# Patient Record
Sex: MALE | Race: WHITE | NOT HISPANIC OR LATINO | URBAN - METROPOLITAN AREA
[De-identification: names, ages, dates, MRNs, and addresses within clinical notes are randomized per-mention and may not be internally consistent; named-entity substitution may affect disease eponyms.]

---

## 2018-01-17 ENCOUNTER — OUTPATIENT (OUTPATIENT)
Dept: OUTPATIENT SERVICES | Facility: HOSPITAL | Age: 68
LOS: 1 days | Discharge: HOME | End: 2018-01-17

## 2018-01-17 DIAGNOSIS — R91.8 OTHER NONSPECIFIC ABNORMAL FINDING OF LUNG FIELD: ICD-10-CM

## 2022-10-28 ENCOUNTER — INPATIENT (INPATIENT)
Facility: HOSPITAL | Age: 72
LOS: 2 days | Discharge: HOME | End: 2022-10-31
Attending: INTERNAL MEDICINE | Admitting: INTERNAL MEDICINE

## 2022-10-28 VITALS
SYSTOLIC BLOOD PRESSURE: 182 MMHG | DIASTOLIC BLOOD PRESSURE: 100 MMHG | WEIGHT: 179.9 LBS | RESPIRATION RATE: 20 BRPM | OXYGEN SATURATION: 98 % | TEMPERATURE: 98 F | HEART RATE: 87 BPM

## 2022-10-28 LAB
ALBUMIN SERPL ELPH-MCNC: 4.4 G/DL — SIGNIFICANT CHANGE UP (ref 3.5–5.2)
ALP SERPL-CCNC: 95 U/L — SIGNIFICANT CHANGE UP (ref 30–115)
ALT FLD-CCNC: 16 U/L — SIGNIFICANT CHANGE UP (ref 0–41)
ANION GAP SERPL CALC-SCNC: 10 MMOL/L — SIGNIFICANT CHANGE UP (ref 7–14)
AST SERPL-CCNC: 19 U/L — SIGNIFICANT CHANGE UP (ref 0–41)
BASOPHILS # BLD AUTO: 0.03 K/UL — SIGNIFICANT CHANGE UP (ref 0–0.2)
BASOPHILS NFR BLD AUTO: 0.2 % — SIGNIFICANT CHANGE UP (ref 0–1)
BILIRUB SERPL-MCNC: 0.4 MG/DL — SIGNIFICANT CHANGE UP (ref 0.2–1.2)
BUN SERPL-MCNC: 17 MG/DL — SIGNIFICANT CHANGE UP (ref 10–20)
CALCIUM SERPL-MCNC: 9.2 MG/DL — SIGNIFICANT CHANGE UP (ref 8.4–10.4)
CHLORIDE SERPL-SCNC: 102 MMOL/L — SIGNIFICANT CHANGE UP (ref 98–110)
CO2 SERPL-SCNC: 25 MMOL/L — SIGNIFICANT CHANGE UP (ref 17–32)
CREAT SERPL-MCNC: 1.4 MG/DL — SIGNIFICANT CHANGE UP (ref 0.7–1.5)
EGFR: 54 ML/MIN/1.73M2 — LOW
EOSINOPHIL # BLD AUTO: 0.02 K/UL — SIGNIFICANT CHANGE UP (ref 0–0.7)
EOSINOPHIL NFR BLD AUTO: 0.1 % — SIGNIFICANT CHANGE UP (ref 0–8)
GLUCOSE SERPL-MCNC: 177 MG/DL — HIGH (ref 70–99)
HCT VFR BLD CALC: 39.5 % — LOW (ref 42–52)
HGB BLD-MCNC: 13.1 G/DL — LOW (ref 14–18)
IMM GRANULOCYTES NFR BLD AUTO: 0.3 % — SIGNIFICANT CHANGE UP (ref 0.1–0.3)
LACTATE SERPL-SCNC: 2.6 MMOL/L — HIGH (ref 0.7–2)
LIDOCAIN IGE QN: 29 U/L — SIGNIFICANT CHANGE UP (ref 7–60)
LYMPHOCYTES # BLD AUTO: 1.11 K/UL — LOW (ref 1.2–3.4)
LYMPHOCYTES # BLD AUTO: 8.1 % — LOW (ref 20.5–51.1)
MCHC RBC-ENTMCNC: 29.7 PG — SIGNIFICANT CHANGE UP (ref 27–31)
MCHC RBC-ENTMCNC: 33.2 G/DL — SIGNIFICANT CHANGE UP (ref 32–37)
MCV RBC AUTO: 89.6 FL — SIGNIFICANT CHANGE UP (ref 80–94)
MONOCYTES # BLD AUTO: 0.74 K/UL — HIGH (ref 0.1–0.6)
MONOCYTES NFR BLD AUTO: 5.4 % — SIGNIFICANT CHANGE UP (ref 1.7–9.3)
NEUTROPHILS # BLD AUTO: 11.72 K/UL — HIGH (ref 1.4–6.5)
NEUTROPHILS NFR BLD AUTO: 85.9 % — HIGH (ref 42.2–75.2)
NRBC # BLD: 0 /100 WBCS — SIGNIFICANT CHANGE UP (ref 0–0)
PLATELET # BLD AUTO: 165 K/UL — SIGNIFICANT CHANGE UP (ref 130–400)
POTASSIUM SERPL-MCNC: 4.5 MMOL/L — SIGNIFICANT CHANGE UP (ref 3.5–5)
POTASSIUM SERPL-SCNC: 4.5 MMOL/L — SIGNIFICANT CHANGE UP (ref 3.5–5)
PROT SERPL-MCNC: 7.5 G/DL — SIGNIFICANT CHANGE UP (ref 6–8)
RBC # BLD: 4.41 M/UL — LOW (ref 4.7–6.1)
RBC # FLD: 14.6 % — HIGH (ref 11.5–14.5)
SODIUM SERPL-SCNC: 137 MMOL/L — SIGNIFICANT CHANGE UP (ref 135–146)
WBC # BLD: 13.66 K/UL — HIGH (ref 4.8–10.8)
WBC # FLD AUTO: 13.66 K/UL — HIGH (ref 4.8–10.8)

## 2022-10-28 PROCEDURE — 71045 X-RAY EXAM CHEST 1 VIEW: CPT | Mod: 26

## 2022-10-28 PROCEDURE — 99285 EMERGENCY DEPT VISIT HI MDM: CPT

## 2022-10-28 PROCEDURE — 74177 CT ABD & PELVIS W/CONTRAST: CPT | Mod: 26,MA

## 2022-10-28 RX ORDER — SODIUM CHLORIDE 9 MG/ML
1000 INJECTION, SOLUTION INTRAVENOUS ONCE
Refills: 0 | Status: COMPLETED | OUTPATIENT
Start: 2022-10-28 | End: 2022-10-28

## 2022-10-28 RX ORDER — SODIUM CHLORIDE 9 MG/ML
1000 INJECTION INTRAMUSCULAR; INTRAVENOUS; SUBCUTANEOUS ONCE
Refills: 0 | Status: COMPLETED | OUTPATIENT
Start: 2022-10-28 | End: 2022-10-28

## 2022-10-28 RX ORDER — MORPHINE SULFATE 50 MG/1
4 CAPSULE, EXTENDED RELEASE ORAL ONCE
Refills: 0 | Status: DISCONTINUED | OUTPATIENT
Start: 2022-10-28 | End: 2022-10-28

## 2022-10-28 RX ADMIN — SODIUM CHLORIDE 1000 MILLILITER(S): 9 INJECTION INTRAMUSCULAR; INTRAVENOUS; SUBCUTANEOUS at 20:49

## 2022-10-28 RX ADMIN — MORPHINE SULFATE 4 MILLIGRAM(S): 50 CAPSULE, EXTENDED RELEASE ORAL at 20:49

## 2022-10-28 NOTE — ED PROVIDER NOTE - NS ED ATTENDING STATEMENT MOD
This was a shared visit with the GRZEGORZ. I reviewed and verified the documentation and independently performed the documented:

## 2022-10-28 NOTE — ED PROVIDER NOTE - CLINICAL SUMMARY MEDICAL DECISION MAKING FREE TEXT BOX
Patient evaluated for left flank pain, found to have obstructing stone, UA without bacteria noted.  Lactate elevated to 2.7 and patient treated with IV fluids and pain control.  Lactate repeated with slight elevation to 2.7. Patient denied any chronic alcohol use, no medications or additional history that could contribute to persistent lactate. Given concern for potential for septic stone and persistent lactate, patient admitted for further work-up, monitoring and treatment. Discussed extensively with patient and wife and all results discussed, questions answered.  Patient agreed to admission.

## 2022-10-28 NOTE — ED PROVIDER NOTE - OBJECTIVE STATEMENT
71 yold male to ED Pmhx Htn, Dm, Multiple myeloma c/o left side flank pain radiating to LLQ started earlier today with nausea no vomiting; no urinary sx - burning, urgency, frequency or hematuria; no fever, chills; pt denies prior hx of kidney stones or diverticulitis; pt attributes sx to possibly chinese food left overs; deneis diarrhea or brbpr;

## 2022-10-28 NOTE — ED PROVIDER NOTE - PHYSICAL EXAMINATION
Constitutional: Well developed, well nourished. NAD  Head: Normocephalic, atraumatic.  Eyes: PERRL, EOMI.  ENT: No nasal discharge. Mucous membranes dry.  Neck: Supple. Painless ROM.  Cardiovascular:  Regular rate and rhythm.  Pulmonary:  Lungs clear to auscultation bilaterally.   Abdominal: Soft. +mild left flank tenderness and LLQ tenderness; no rebound, guarding; no rash;   Extremities. Pelvis stable. No lower extremity edema, symmetric calves.  Skin: No rashes, cyanosis.  Neuro: AAOx3. No focal neurological deficits.  Psych: Normal mood. Normal affect.

## 2022-10-28 NOTE — ED PROVIDER NOTE - ATTENDING APP SHARED VISIT CONTRIBUTION OF CARE
71-year-old male past medical history hypertension multiple myeloma presents with left-sided flank pain rating to the left lower quadrant nausea no vomiting no urinary symptoms no fever chills no history of kidney stones.  Pain worse after eating Chinese food.  Well appearing, NAD, non toxic. NCAT PERRLA EOMI neck supple non tender normal wob cta bl rrr abdomen s left-sided flank pain, left lower quadrant pain nd no rebound no guarding WWPx4 neuro non focal.  Rule out kidney stone versus UTI versus diverticulitis labs CT reassess

## 2022-10-28 NOTE — ED PROVIDER NOTE - CARE PLAN
Principal Discharge DX:	Nephrolithiasis  Secondary Diagnosis:	High serum lactate  Secondary Diagnosis:	Multiple myeloma   1

## 2022-10-29 LAB
APPEARANCE UR: CLEAR — SIGNIFICANT CHANGE UP
BACTERIA # UR AUTO: ABNORMAL
BASE EXCESS BLDV CALC-SCNC: -0.1 MMOL/L — SIGNIFICANT CHANGE UP (ref -2–3)
BILIRUB UR-MCNC: NEGATIVE — SIGNIFICANT CHANGE UP
CA-I SERPL-SCNC: 1.2 MMOL/L — SIGNIFICANT CHANGE UP (ref 1.15–1.33)
COLOR SPEC: SIGNIFICANT CHANGE UP
DIFF PNL FLD: ABNORMAL
EPI CELLS # UR: 3 /HPF — SIGNIFICANT CHANGE UP (ref 0–5)
GAS PNL BLDV: 134 MMOL/L — LOW (ref 136–145)
GAS PNL BLDV: SIGNIFICANT CHANGE UP
GLUCOSE BLDC GLUCOMTR-MCNC: 124 MG/DL — HIGH (ref 70–99)
GLUCOSE BLDC GLUCOMTR-MCNC: 137 MG/DL — HIGH (ref 70–99)
GLUCOSE BLDC GLUCOMTR-MCNC: 144 MG/DL — HIGH (ref 70–99)
GLUCOSE UR QL: ABNORMAL
GRAN CASTS # UR COMP ASSIST: 2 /LPF — HIGH
HCO3 BLDV-SCNC: 25 MMOL/L — SIGNIFICANT CHANGE UP (ref 22–29)
HCT VFR BLDA CALC: 37 % — LOW (ref 39–51)
HGB BLD CALC-MCNC: 12.2 G/DL — LOW (ref 12.6–17.4)
HYALINE CASTS # UR AUTO: 6 /LPF — SIGNIFICANT CHANGE UP (ref 0–7)
KETONES UR-MCNC: SIGNIFICANT CHANGE UP
LACTATE BLDV-MCNC: 2.7 MMOL/L — HIGH (ref 0.5–2)
LACTATE SERPL-SCNC: 2.8 MMOL/L — HIGH (ref 0.7–2)
LEUKOCYTE ESTERASE UR-ACNC: NEGATIVE — SIGNIFICANT CHANGE UP
NITRITE UR-MCNC: NEGATIVE — SIGNIFICANT CHANGE UP
PCO2 BLDV: 44 MMHG — SIGNIFICANT CHANGE UP (ref 42–55)
PH BLDV: 7.37 — SIGNIFICANT CHANGE UP (ref 7.32–7.43)
PH UR: 6.5 — SIGNIFICANT CHANGE UP (ref 5–8)
PO2 BLDV: 39 MMHG — SIGNIFICANT CHANGE UP
POTASSIUM BLDV-SCNC: 4.6 MMOL/L — SIGNIFICANT CHANGE UP (ref 3.5–5.1)
PROT UR-MCNC: SIGNIFICANT CHANGE UP
RBC CASTS # UR COMP ASSIST: 14 /HPF — HIGH (ref 0–4)
SAO2 % BLDV: 68.1 % — SIGNIFICANT CHANGE UP
SARS-COV-2 RNA SPEC QL NAA+PROBE: SIGNIFICANT CHANGE UP
SP GR SPEC: 1.02 — SIGNIFICANT CHANGE UP (ref 1.01–1.03)
UROBILINOGEN FLD QL: SIGNIFICANT CHANGE UP
WBC UR QL: 3 /HPF — SIGNIFICANT CHANGE UP (ref 0–5)

## 2022-10-29 PROCEDURE — 99238 HOSP IP/OBS DSCHRG MGMT 30/<: CPT | Mod: 25

## 2022-10-29 PROCEDURE — 99222 1ST HOSP IP/OBS MODERATE 55: CPT

## 2022-10-29 RX ORDER — LOSARTAN POTASSIUM 100 MG/1
100 TABLET, FILM COATED ORAL DAILY
Refills: 0 | Status: DISCONTINUED | OUTPATIENT
Start: 2022-10-29 | End: 2022-10-31

## 2022-10-29 RX ORDER — TAMSULOSIN HYDROCHLORIDE 0.4 MG/1
2 CAPSULE ORAL
Qty: 0 | Refills: 0 | DISCHARGE
Start: 2022-10-29

## 2022-10-29 RX ORDER — ONDANSETRON 8 MG/1
4 TABLET, FILM COATED ORAL THREE TIMES A DAY
Refills: 0 | Status: DISCONTINUED | OUTPATIENT
Start: 2022-10-29 | End: 2022-10-31

## 2022-10-29 RX ORDER — ALLOPURINOL 300 MG
1 TABLET ORAL
Qty: 0 | Refills: 0 | DISCHARGE

## 2022-10-29 RX ORDER — TAMSULOSIN HYDROCHLORIDE 0.4 MG/1
0.8 CAPSULE ORAL AT BEDTIME
Refills: 0 | Status: DISCONTINUED | OUTPATIENT
Start: 2022-10-29 | End: 2022-10-31

## 2022-10-29 RX ORDER — METFORMIN HYDROCHLORIDE 850 MG/1
1 TABLET ORAL
Qty: 0 | Refills: 0 | DISCHARGE

## 2022-10-29 RX ORDER — SODIUM CHLORIDE 9 MG/ML
1000 INJECTION INTRAMUSCULAR; INTRAVENOUS; SUBCUTANEOUS
Refills: 0 | Status: DISCONTINUED | OUTPATIENT
Start: 2022-10-29 | End: 2022-10-31

## 2022-10-29 RX ORDER — OLMESARTAN MEDOXOMIL 5 MG/1
1 TABLET, FILM COATED ORAL
Qty: 0 | Refills: 0 | DISCHARGE

## 2022-10-29 RX ORDER — FINASTERIDE 5 MG/1
5 TABLET, FILM COATED ORAL DAILY
Refills: 0 | Status: DISCONTINUED | OUTPATIENT
Start: 2022-10-29 | End: 2022-10-31

## 2022-10-29 RX ORDER — CEFTRIAXONE 500 MG/1
1000 INJECTION, POWDER, FOR SOLUTION INTRAMUSCULAR; INTRAVENOUS EVERY 24 HOURS
Refills: 0 | Status: DISCONTINUED | OUTPATIENT
Start: 2022-10-29 | End: 2022-10-31

## 2022-10-29 RX ORDER — HEPARIN SODIUM 5000 [USP'U]/ML
5000 INJECTION INTRAVENOUS; SUBCUTANEOUS EVERY 12 HOURS
Refills: 0 | Status: DISCONTINUED | OUTPATIENT
Start: 2022-10-29 | End: 2022-10-31

## 2022-10-29 RX ORDER — CEFTRIAXONE 500 MG/1
1000 INJECTION, POWDER, FOR SOLUTION INTRAMUSCULAR; INTRAVENOUS ONCE
Refills: 0 | Status: COMPLETED | OUTPATIENT
Start: 2022-10-29 | End: 2022-10-29

## 2022-10-29 RX ORDER — ACETAMINOPHEN 500 MG
650 TABLET ORAL EVERY 6 HOURS
Refills: 0 | Status: DISCONTINUED | OUTPATIENT
Start: 2022-10-29 | End: 2022-10-31

## 2022-10-29 RX ORDER — DUTASTERIDE 0.5 MG/1
1 CAPSULE, LIQUID FILLED ORAL
Qty: 0 | Refills: 0 | DISCHARGE

## 2022-10-29 RX ORDER — ALLOPURINOL 300 MG
300 TABLET ORAL DAILY
Refills: 0 | Status: DISCONTINUED | OUTPATIENT
Start: 2022-10-29 | End: 2022-10-31

## 2022-10-29 RX ORDER — METOPROLOL TARTRATE 50 MG
1 TABLET ORAL
Qty: 0 | Refills: 0 | DISCHARGE

## 2022-10-29 RX ORDER — METOPROLOL TARTRATE 50 MG
100 TABLET ORAL DAILY
Refills: 0 | Status: DISCONTINUED | OUTPATIENT
Start: 2022-10-29 | End: 2022-10-31

## 2022-10-29 RX ADMIN — TAMSULOSIN HYDROCHLORIDE 0.8 MILLIGRAM(S): 0.4 CAPSULE ORAL at 21:34

## 2022-10-29 RX ADMIN — CEFTRIAXONE 100 MILLIGRAM(S): 500 INJECTION, POWDER, FOR SOLUTION INTRAMUSCULAR; INTRAVENOUS at 13:45

## 2022-10-29 RX ADMIN — SODIUM CHLORIDE 100 MILLILITER(S): 9 INJECTION INTRAMUSCULAR; INTRAVENOUS; SUBCUTANEOUS at 13:47

## 2022-10-29 RX ADMIN — MORPHINE SULFATE 4 MILLIGRAM(S): 50 CAPSULE, EXTENDED RELEASE ORAL at 04:02

## 2022-10-29 RX ADMIN — SODIUM CHLORIDE 1000 MILLILITER(S): 9 INJECTION, SOLUTION INTRAVENOUS at 00:14

## 2022-10-29 RX ADMIN — CEFTRIAXONE 100 MILLIGRAM(S): 500 INJECTION, POWDER, FOR SOLUTION INTRAMUSCULAR; INTRAVENOUS at 03:33

## 2022-10-29 RX ADMIN — HEPARIN SODIUM 5000 UNIT(S): 5000 INJECTION INTRAVENOUS; SUBCUTANEOUS at 21:34

## 2022-10-29 NOTE — DISCHARGE NOTE NURSING/CASE MANAGEMENT/SOCIAL WORK - NSDCPEFALRISK_GEN_ALL_CORE
For information on Fall & Injury Prevention, visit: [email or web address] OR  [email or web address] OR  https://www.cdc.gov/steadi/patient.html

## 2022-10-29 NOTE — DISCHARGE NOTE PROVIDER - NSDCMRMEDTOKEN_GEN_ALL_CORE_FT
allopurinol 300 mg oral tablet: 1 tab(s) orally once a day  amoxicillin-clavulanate 875 mg-125 mg oral tablet: 1 tab(s) orally 2 times a day ..  dutasteride 0.5 mg oral capsule: 1 cap(s) orally once a day  metFORMIN 500 mg oral tablet: 1 tab(s) orally 2 times a day  metoprolol succinate 100 mg oral tablet, extended release: 1 tab(s) orally once a day  olmesartan 40 mg oral tablet: 1 tab(s) orally once a day  tamsulosin 0.4 mg oral capsule: 2 cap(s) orally once a day (at bedtime)   allopurinol 300 mg oral tablet: 1 tab(s) orally once a day  amoxicillin-clavulanate 875 mg-125 mg oral tablet: 1 tab(s) orally 2 times a day ..  dutasteride 0.5 mg oral capsule: 1 cap(s) orally once a day  metFORMIN 500 mg oral tablet: 1 tab(s) orally 2 times a day  metoprolol succinate 100 mg oral tablet, extended release: 1 tab(s) orally once a day  olmesartan 40 mg oral tablet: 1 tab(s) orally once a day  tamsulosin 0.4 mg oral capsule: 2 cap(s) orally once a day (at bedtime)   traMADol 50 mg oral tablet: 1 tab(s) orally every 6 hours, As Needed -for severe pain MDD:4 tabs

## 2022-10-29 NOTE — DISCHARGE NOTE NURSING/CASE MANAGEMENT/SOCIAL WORK - PATIENT PORTAL LINK FT
You can access the FollowMyHealth Patient Portal offered by [facility] by registering at the following website: [email or web address]. By joining [employer]’s FollowMyHealth portal, you will also be able to view your health information using other applications (apps) compatible with our system.

## 2022-10-29 NOTE — CONSULT NOTE ADULT - NS ATTEND AMEND GEN_ALL_CORE FT
pt seen and examined 10/29/22 ct ap images visualized showing mild hydro level of small ureteral stone. pt is asymptomatic. non toxic. very much so wishes to leave hospital for trial of passage.  rec toradol flomax. and he understands I f f/c. he must return

## 2022-10-29 NOTE — H&P ADULT - ASSESSMENT
Pt is a 72 yo Male with a PMH including HTN, DM, multiple myeloma (follows Dr Campbell at [facility]), gout and BPH (follows w/ outside urologist in [place]) who presents to the hospital with left flank pain x 1 day. pt was found to have 4 mm obstructing calculus in the proximal left ureter with associated mild left hydroureter and hydronephrosis.    # 4 mm obstructing calculus in the proximal left ureter with associated mild left hydroureter and hydronephrosis  # SIRS present on admission: , wbc 13.6  # hx of BPH   - CT Abdomen and Pelvis w/ IV Cont (10.28.22 @ 22:53): 4 mm obstructing calculus in the proximal left ureter with associated mild left hydroureter and hydronephrosis. Markedly enlarged heterogeneous prostate. Partially distended urinary bladder with questionable circumferential wall thickening which may reflect sequela of underdistention as well as muscular hypertrophy. Cystitis is not excluded. Bilateral small fat-containing inguinal hernias  - WBC 13.6,  lactate 2.6. Cr 1.4,  - UA positive for blood, few bacteria and glucose.  - Pt is hemodynamically stable.   - s/p 2L IV fluid bolus and ceftriaxone IV In ED  - Urology recommendations appreciated> patient prefers trial of passage  versus cystoscopy and stent placement   - c/w ceftriaxone 1gr IV Q24  - NPO  - c/w IVF hydration NS at 100cc/hr  - F/u Ucx, Bcx  - start Flomax  - c/w finasteride  - Pain control with Tylenol; nausea control with zofran PRN  - Monitor urine output- repeat lactate at 20:00    # HTN  - BP: 177/84 (29 Oct 2022 07:55) (175/92 - 200/98)  - restart home medications: metoprolol succinate 100mg QD, on olmesartan 40 mg QD at home( losartan while hospitalised)    # MM  - patient reports he follows Dr. Campbell in [facility]> planned for chemotherapy in May 2023  - OP follow up    # DM  - on metformin at home  - hold oral meds;    - check fs;  start insulin basal, bolus, s/s prn if finger stick glucose >180 mg persistently    # gout  - c/w allopurinol    Diet: NPO  Activity: as tolerated  DVT Prophylaxis: heparin subQ  GI Prophylaxis: not indicated  CHG Order  Code Status: full code  Disposition: admit to medicine       Pt is a 70 yo Male with a PMH including HTN, DM, multiple myeloma (follows Dr Campbell at [facility]), gout and BPH (follows w/ outside urologist in [place]) who presents to the hospital with left flank pain x 1 day. pt was found to have 4 mm obstructing calculus in the proximal left ureter with associated mild left hydroureter and hydronephrosis.    # 4 mm obstructing calculus in the proximal left ureter with associated mild left hydroureter and hydronephrosis  # SIRS present on admission: , wbc 13.6  # hx of BPH   - CT Abdomen and Pelvis w/ IV Cont (10.28.22 @ 22:53): 4 mm obstructing calculus in the proximal left ureter with associated mild left hydroureter and hydronephrosis. Markedly enlarged heterogeneous prostate. Partially distended urinary bladder with questionable circumferential wall thickening which may reflect sequela of underdistention as well as muscular hypertrophy. Cystitis is not excluded. Bilateral small fat-containing inguinal hernias  - WBC 13.6,  lactate 2.6. Cr 1.4,  - UA positive for blood, few bacteria and glucose.  - Pt is hemodynamically stable.   - s/p 2L IV fluid bolus and ceftriaxone IV In ED  - Urology recommendations appreciated> patient prefers trial of passage  versus cystoscopy and stent placement   - c/w ceftriaxone 1gr IV Q24  - NPO  - c/w IVF hydration NS at 100cc/hr  - F/u Ucx, Bcx  - start Flomax  - c/w finasteride  - Pain control with Tylenol; nausea control with zofran PRN  - Monitor urine output- repeat lactate at 20:00    # HTN  - BP: 177/84 (29 Oct 2022 07:55) (175/92 - 200/98)  - restart home medications: metoprolol succinate 100mg QD, on olmesartan 40 mg QD at home( losartan while hospitalised)    # MM  - patient reports he follows Dr. Campbell in [facility]> planned for chemotherapy in May 2023  - OP follow up    # DM  - on metformin at home  - hold oral meds;    - check fs;  start insulin basal, bolus, s/s prn if finger stick glucose >180 mg persistently    # gout  - c/w allopurinol    Diet: NPO  Activity: as tolerated  DVT Prophylaxis: heparin subQ  GI Prophylaxis: not indicated  CHG Order  Code Status: full code  Disposition: admit to medicine    _______________________________________________________________________________________________________________________________________    70 yo Male with a PMH including HTN, DM, multiple myeloma (follows Dr Campbell at [facility]), gout and BPH (follows w/ outside urologist in [place]) who presents to the hospital with left flank pain x 1 day. Pt was found to have 4 mm obstructing calculus in the proximal left ureter with associated mild left hydroureter and hydronephrosis.    # Left flank pain secondary to left 4 mm obstructing calculus with mild left hydroureter and hydronephrosis  - SIRS present on admission / CT showing circumferential wall thickening and having leukocytosis, also having markedly enlarged heterogeneous prostate  - UA negative, urology recommending admission for observation and abx  - WBC 13.6,  lactate 2.6. Cr 1.4  - UA positive for blood, few bacteria and glucose.  - c/w IVF hydration NS at 100cc/hr  - F/u Ucx, Bcx  - c/w flomax and finasteride  - Pain control with Tylenol; nausea control with zofran PRN  - Monitor urine output- repeat lactate at 20:00  - outpatient nephrology for possible causes, likely component of MM however serum calcium is normal, however patient also has gout    # HTN  - BP: 177/84 (29 Oct 2022 07:55) (175/92 - 200/98)  - restart home medications: metoprolol succinate 100mg QD, on olmesartan 40 mg QD at home( losartan while hospitalised)    # MM  - patient reports he follows Dr. Campbell in [facility]> planned for chemotherapy in May 2023  - OP follow up    # DM  - on metformin at home  - hold oral meds;    - check fs;  start insulin basal, bolus, s/s prn if finger stick glucose >180 mg persistently    # gout  - c/w allopurinol    Diet: NPO as per urology  Activity: as tolerated  DVT Prophylaxis: heparin subQ  GI Prophylaxis: not indicated  Code Status: full code  Disposition: admit to medicine as per urology admitted for observation and abx    General: WN/WD NAD  Neurology: A&Ox3, nonfocal, ALLEN x 4  Head:  Normocephalic, atraumatic  ENT:  Mucosa moist, no ulcerations  Neck:  Supple, no sinuses or palpable masses  Lymphatic:  No palpable cervical, supraclavicular, axillary or inguinal adenopathy  Respiratory: CTA B/L  CV: RRR, S1S2, no murmur  Abdominal: Soft, NT, ND no palpable mass  MSK: +1 bl le edema  Incisions: intact, no erythema or drainage    Patient seen and examined independently. Agree with resident note with exceptions. Case discussed with housestaff, nursing and patient

## 2022-10-29 NOTE — H&P ADULT - NSHPPHYSICALEXAM_GEN_ALL_CORE
PHYSICAL EXAM:  GENERAL: NAD, AAO x 4, 71y M  HEAD:  Atraumatic, Normocephalic  EYES: EOMI, conjunctiva and sclera white  NECK: Supple, No JVD  CHEST/LUNG: Clear to auscultation bilaterally; No wheeze; No crackles; No accessory muscles used  HEART: Regular rate and rhythm; No murmurs;   ABDOMEN: Soft, Nontender, Nondistended; Bowel sounds present; No guarding, No organomegaly  EXTREMITIES:  2+ Peripheral Pulses, No cyanosis or edema  NEUROLOGY: non-focal PHYSICAL EXAM:  GENERAL: NAD, AAO x 4, 71y M  HEAD:  Atraumatic, Normocephalic  EYES: EOMI, conjunctiva and sclera white  NECK: Supple, No JVD  CHEST/LUNG: Clear to auscultation bilaterally; No wheeze; No crackles; No accessory muscles used  HEART: tachycardic; No murmurs; +s1 s2  ABDOMEN: Soft, Nondistended; mild tenderness to palpation on the left. Bowel sounds present; No guarding, No organomegaly  EXTREMITIES:  2+ Peripheral Pulses, No cyanosis or edema  NEUROLOGY: non-focal

## 2022-10-29 NOTE — DISCHARGE NOTE PROVIDER - CARE PROVIDER_API CALL
[address] [address]  Phone: [phone]  Fax: [phone]  Follow Up Time: 2 weeks    Jerry Henao)  Urology  [address] [place] [address], [address]  [place] [address]  Phone: [phone]  Fax: [phone]  Follow Up Time: 2 weeks   [address] [address]  Phone: [phone]  Fax: [phone]  Follow Up Time: 2 weeks    Jerry Henao)  Urology  [address] [place] [address], [address]  [place] [address]  Phone: [phone]  Fax: [phone]  Follow Up Time: 1 week

## 2022-10-29 NOTE — DISCHARGE NOTE PROVIDER - PROVIDER TOKENS
PROVIDER:[TOKEN:[[id number]],FOLLOWUP:[2 weeks]],PROVIDER:[TOKEN:[[id number]],FOLLOWUP:[2 weeks]] PROVIDER:[TOKEN:[[id number]],FOLLOWUP:[2 weeks]],PROVIDER:[TOKEN:[[id number]],FOLLOWUP:[1 week]]

## 2022-10-29 NOTE — H&P ADULT - NSICDXPASTMEDICALHX_GEN_ALL_CORE_FT
PAST MEDICAL HISTORY:  BPH (benign prostatic hyperplasia)     Diabetes     Gout     HTN (hypertension)     Multiple myeloma

## 2022-10-29 NOTE — ED ADULT NURSE REASSESSMENT NOTE - NS ED NURSE REASSESS COMMENT FT1
received at 4am - a&o x3, ambulated to bed w/o assist, VS as noted, lsc, abd soft, nt/nd, # 18 r ac h/l site wnl, flushes w/ ease, no blood return, awaits bed assignment - will continue to monitor

## 2022-10-29 NOTE — CONSULT NOTE ADULT - ASSESSMENT
Pt is a 70yo Male with a PMH including HTN, DM, multiple myeloma, and BPH (follows w/ outside urologist in [place]) who presents to the hospital with left flank pain x 1 day, found to have mild left hydronephrosis secondary to a 4x3mm proximal ureteral stone.    PLAN:  -Admission for observation and antibiotics  -Discussed options with patient including trial of passage versus cystoscopy and stent placement -- patient states that he would like to see if he can pass the stone at this time  -Patient will need stent placement should they become febrile -- will monitor for fevers  -Strain all urine  -Broad spectrum abx  -NPO for now, IVF hydration  -Trend Cr -- 1.4  -Trend WBC -- 13.66  -Flomax if not contraindicated  -Monitor urine output  -Will discuss with attending  -Will follow Pt is a 72yo Male with a PMH including HTN, DM, multiple myeloma, and BPH (follows w/ outside urologist in [place]) who presents to the hospital with left flank pain x 1 day, found to have mild left hydronephrosis secondary to a 4x3mm proximal ureteral stone.    PLAN:  -Admission for observation and antibiotics  -Discussed options with patient including trial of passage versus cystoscopy and stent placement -- patient states that he would like to see if he can pass the stone at this time  -Patient will need stent placement should they become febrile -- will monitor for fevers  -Strain all urine  -Broad spectrum abx  -NPO for now, IVF hydration  -Trend Cr -- 1.4  -Trend WBC -- 13.66  -F/u Ucx, Bcx  -Flomax if not contraindicated  -Pain and nausea control prn  -Monitor urine output  -Will discuss with attending  -Will follow

## 2022-10-29 NOTE — CONSULT NOTE ADULT - SUBJECTIVE AND OBJECTIVE BOX
Pt is a 72yo Male with a PMH including HTN, DM, multiple myeloma, and BPH (follows w/ outside urologist in [place]) who presents to the hospital with left flank pain x 1 day. Patient states that pain began suddenly and radiated to the groin, though has since resolved. Reports that at time of onset, he had mild nausea without vomiting, which has also since resolved. Denies history of stones or similar previous episodes. Denies fevers, chills, chest pain, SOB, n/v, dysuria, urgency, frequency, hematuria, or other urinary symptoms.     PAST MEDICAL & SURGICAL HISTORY:  HTN (hypertension)  DM  Multiple myeloma  BPH    Allergies  No Known Allergies    SOCIAL HISTORY: No illicit drug use    REVIEW OF SYSTEMS   [x] A ten-point review of systems was otherwise negative except as noted.    Vital Signs Last 24 Hrs  T(C): 36.9 (29 Oct 2022 03:14), Max: 36.9 (29 Oct 2022 03:14)  T(F): 98.4 (29 Oct 2022 03:14), Max: 98.4 (29 Oct 2022 03:14)  HR: 112 (29 Oct 2022 03:14) (87 - 112)  BP: 175/92 (29 Oct 2022 03:14) (175/92 - 182/100)  RR: 20 (29 Oct 2022 03:14) (20 - 20)  SpO2: 97% (29 Oct 2022 03:14) (97% - 98%)    Parameters below as of 28 Oct 2022 19:33  Patient On (Oxygen Delivery Method): room air    PHYSICAL EXAM:  GEN: NAD, awake and alert. Nontoxic appearing.  SKIN: Good color, non-diaphoretic.  HEENT: NC/AT.  RESP: No dyspnea, non-labored breathing. No use of accessory muscles.  CARDIO: +S1/S2  ABDO: Soft, nondistended, nontender to palpation. No palpable bladder, no suprapubic tenderness to palpation.   BACK: No CVAT bilaterally.   : Voiding freely.     LABS:             13.1   13.66 )-----------( 165      ( 28 Oct 2022 21:22 )             39.5     10-28  137  |  102  |  17  ----------------------------<  177<H>  4.5   |  25  |  1.4    Ca    9.2      28 Oct 2022 21:22    TPro  7.5  /  Alb  4.4  /  TBili  0.4  /  DBili  x   /  AST  19  /  ALT  16  /  AlkPhos  95  10-28    Urinalysis Basic - ( 28 Oct 2022 23:54 )  Color: Light Yellow / Appearance: Clear / S.017 / pH: x  Gluc: x / Ketone: Trace  / Bili: Negative / Urobili: <2 mg/dL   Blood: x / Protein: Trace / Nitrite: Negative   Leuk Esterase: Negative / RBC: 14 /HPF / WBC 3 /HPF   Sq Epi: x / Non Sq Epi: 3 /HPF / Bacteria: Few    RADIOLOGY & ADDITIONAL STUDIES:  < from: CT Abdomen and Pelvis w/ IV Cont (10.28.22 @ 22:53) >  FINDINGS:    LOWER CHEST: There is mild bibasilar subsegmental atelectasis.    HEPATOBILIARY: The liver is diminished in attenuation compatible with   fatty infiltration..  No evidence of intra or extrahepatic biliary   dilatation. The gallbladder is suboptimally imaged.    SPLEEN: Unremarkable.    PANCREAS: Unremarkable.    ADRENAL GLANDS: Unremarkable.    KIDNEYS: Delayed left nephrogram and left perinephric stranding extending   inferiorly along the left ureter with mild left hydroureter and   hydronephrosis secondary to a obstructing 4 x 3 mm calculus in the   proximal left ureter. Its density is approximately 700 HU. Bilateral   renal cortical irregularity compatible with sequela of prior insults.   Several hypodensities in the left kidney are too small to further   characterize. Unremarkable right ureter.    ABDOMINOPELVIC NODES: No enlarged abdominal or pelvic lymph nodes.    PELVIC ORGANS: Enlarged heterogeneous prostate measuring up to 6.7 cm   transverse. The urinary bladder is partially distended with questionable   circumferential wall thickening.    PERITONEUM/MESENTERY/BOWEL: No bowel obstruction, ascites or free   intraperitoneal air. Normal caliber appendix. Several focal densities in   the small bowel loops possibly reflecting pills.    BONES/SOFT TISSUES: There are degenerative changes of the spine.   Bilateral fat-containing inguinal hernias, small.    1.1 cm in cutaneous nodule is noted in the left anterior abdomen at the   level of the xiphoid (). This may reflect a sebaceous cyst. A similar   smaller noduleis noted in the right flank ()    VASCULAR:  The aorta is normal in caliber. Minimal atherosclerotic   calcifications.      IMPRESSION:    4 mm obstructing calculus in the proximal left ureter with associated   mild left hydroureter and hydronephrosis.    Markedly enlarged heterogeneous prostate. Partially distended urinary   bladder with questionable circumferential wall thickening which may   reflect sequela of underdistention as well as muscular hypertrophy.   Cystitis is not excluded.    Bilateral small fat-containing inguinal hernias    Other findings as above.  < end of copied text >

## 2022-10-29 NOTE — DISCHARGE NOTE PROVIDER - HOSPITAL COURSE
Pt is a 70 yo Male with a PMH including HTN, DM, multiple myeloma (follows Dr Campbell at [facility]), gout and BPH (follows w/ outside urologist in [place]) who presents to the hospital with left flank pain x 1 day. Patient states that left back pain began suddenly since 2pm yesterday and radiated to the LLQ abdomen/groin, though has since resolved. Reports that at time of onset, he had mild nausea without vomiting, which has also since resolved. Denies history of stones or similar previous episodes. Denies fevers, chills, chest pain, SOB, dysuria, urgency, frequency, hematuria, or other urinary symptoms, diarrhea, constipation,  LE edema, inability to ambulate.    ICU Vital Signs Last 24 Hrs  T(C): 37.2 (29 Oct 2022 07:55), Max: 37.2 (29 Oct 2022 07:55)  T(F): 98.9 (29 Oct 2022 07:55), Max: 98.9 (29 Oct 2022 07:55)  HR: 109 (29 Oct 2022 07:55) (87 - 112)  BP: 177/84 (29 Oct 2022 07:55) (175/92 - 200/98)  RR: 18 (29 Oct 2022 07:55) (18 - 20)  SpO2: 96% (29 Oct 2022 07:55) (96% - 98%)    O2 Parameters below as of 29 Oct 2022 07:55  Patient On (Oxygen Delivery Method): room air    On Labs WBC 13.6, Hgb 13.1, Cr 1.4, lactate 2.6. UA positive for blood, few bacteria and glucose.   CT abd demonstrated 4 mm obstructing calculus in the proximal left ureter with associated mild left hydroureter and hydronephrosis. Markedly enlarged heterogeneous prostate. Partially distended urinary bladder with questionable circumferential wall thickening which may reflect sequela of underdistention as well as muscular hypertrophy.   Cystitis is not excluded. Bilateral small fat-containing inguinal hernias  CXR unremarkable.  Patient was given 2L IV fluid bolus, was started on ceftriaxone IV. Patient was seen by Urology in ED, opted for trial of passage.      Urology notes: Plan   -Discussed options with patient including trial of passage versus cystoscopy and stent placement -- patient states that he would like to see if he can pass the stone at this time  -Patient will need stent placement should they become symptomatic such as  febrile -- will monitor for fevers  -Continue to Strain all urine  -Trend Cr -- 1.4  -Trend WBC -- 13.66  -F/u Ucx, Bcx  -Flomax if not contraindicated  - Continue Finasteride   -Pain and nausea control prn  -Monitor urine output  - No acute  intervention at this time   -Will discuss with attending      I informed this patient of the need for further medical evaluation and care given the patient's current medical condition.   This patient refused further medical evaluation and treatment and expressed a strong desire to leave the emergency department.  I informed this patient of the benefits of further medical evaluation and care at this facility.  I informed this patient of the risks of leaving against our medical advice without properly completing our evaluation today.  These risks included illness, injury, permanent disability and even death.  I informed the patient of the possible necessity for hospital admission depending on future findings.  The patient understood the risks and benefits of further medical treatment and the possible need for admission.  The patient continued to vehemently refuse further evaluation and treatment and continued to express a strong desire  to leave the emergency department.  At the time of discussion this patient maintained full faculties of judgement and medical decision making capacity (see below).    In accordance with this patient's wishes the patient was discharged from the emergency department against our medical advice in stable condition with normal vital signs in no acute distress.     Pt is a 72 yo Male with a PMH including HTN, DM, multiple myeloma (follows Dr Campbell at [facility]), gout and BPH (follows w/ outside urologist in [place]) who presents to the hospital with left flank pain x 1 day. Patient states that left back pain began suddenly since 2pm yesterday and radiated to the LLQ abdomen/groin, though has since resolved. Reports that at time of onset, he had mild nausea without vomiting, which has also since resolved. Denies history of stones or similar previous episodes. Denies fevers, chills, chest pain, SOB, dysuria, urgency, frequency, hematuria, or other urinary symptoms, diarrhea, constipation,  LE edema, inability to ambulate.    ICU Vital Signs Last 24 Hrs  T(C): 37.2 (29 Oct 2022 07:55), Max: 37.2 (29 Oct 2022 07:55)  T(F): 98.9 (29 Oct 2022 07:55), Max: 98.9 (29 Oct 2022 07:55)  HR: 109 (29 Oct 2022 07:55) (87 - 112)  BP: 177/84 (29 Oct 2022 07:55) (175/92 - 200/98)  RR: 18 (29 Oct 2022 07:55) (18 - 20)  SpO2: 96% (29 Oct 2022 07:55) (96% - 98%)    O2 Parameters below as of 29 Oct 2022 07:55  Patient On (Oxygen Delivery Method): room air    On Labs WBC 13.6, Hgb 13.1, Cr 1.4, lactate 2.6. UA positive for blood, few bacteria and glucose.   CT abd demonstrated 4 mm obstructing calculus in the proximal left ureter with associated mild left hydroureter and hydronephrosis. Markedly enlarged heterogeneous prostate. Partially distended urinary bladder with questionable circumferential wall thickening which may reflect sequela of underdistention as well as muscular hypertrophy.   Cystitis is not excluded. Bilateral small fat-containing inguinal hernias  CXR unremarkable.  Patient was given 2L IV fluid bolus, was started on ceftriaxone IV. Patient was seen by Urology in ED, opted for trial of passage.      Urology notes: Plan   -Discussed options with patient including trial of passage versus cystoscopy and stent placement -- patient states that he would like to see if he can pass the stone at this time  -Patient will need stent placement should they become symptomatic such as  febrile -- will monitor for fevers  -Continue to Strain all urine  -Trend Cr -- 1.4  -Trend WBC -- 13.66  -F/u Ucx, Bcx  -Flomax if not contraindicated  - Continue Finasteride   -Pain and nausea control prn  -Monitor urine output  - No acute  intervention at this time   -Will discuss with attending      I informed this patient of the need for further medical evaluation and care given the patient's current medical condition.   This patient refused further medical evaluation and treatment and expressed a strong desire to leave the emergency department.  I informed this patient of the benefits of further medical evaluation and care at this facility.  I informed this patient of the risks of leaving against our medical advice without properly completing our evaluation today.  These risks included illness, injury, permanent disability and even death.  I informed the patient of the possible necessity for hospital admission depending on future findings.  The patient understood the risks and benefits of further medical treatment and the possible need for admission.  The patient continued to vehemently refuse further evaluation and treatment and continued to express a strong desire  to leave the emergency department.  At the time of discussion this patient maintained full faculties of judgement and medical decision making capacity (see below).    In accordance with this patient's wishes the patient was discharged from the emergency department against our medical advice in stable condition with normal vital signs in no acute distress.        Attending attestation:  -Agree with above. Pt admitted for left proximal ureteral stone. Urology was consulted and there was discussion on possible stent placement if pt was unable to pass stone. Pt admitted for IVFs and pain management. Requested to leave AMA. Understands risks of leaving before completing treatment. Teach back performed. IV removed and pt left the pt hospital.   Pt is a 72 yo Male with a PMH including HTN, DM, multiple myeloma (follows Dr Campbell at [facility]), gout and BPH (follows w/ outside urologist in [place]) who presents to the hospital with left flank pain x 1 day. Patient states that left back pain began suddenly since 2pm yesterday and radiated to the LLQ abdomen/groin, though has since resolved. Reports that at time of onset, he had mild nausea without vomiting, which has also since resolved. Denies history of stones or similar previous episodes. Denies fevers, chills, chest pain, SOB, dysuria, urgency, frequency, hematuria, or other urinary symptoms, diarrhea, constipation,  LE edema, inability to ambulate.    On Labs WBC 13.6, Hgb 13.1, Cr 1.4, lactate 2.6. UA positive for blood, few bacteria and glucose.   CT abd demonstrated 4 mm obstructing calculus in the proximal left ureter with associated mild left hydroureter and hydronephrosis. Markedly enlarged heterogeneous prostate. Partially distended urinary bladder with questionable circumferential wall thickening which may reflect sequela of underdistention as well as muscular hypertrophy.   Cystitis is not excluded. Bilateral small fat-containing inguinal hernias  CXR unremarkable.  Patient was given 2L IV fluid bolus, was started on ceftriaxone IV. Patient was seen by Urology in ED, opted for trial of passage.  Pt admitted to medicine for L Flank pain    HOSPITAL COURSE:    Urology was consulted, recommended increased hydration and to d/c pt with strain to monitor stone passage.    Pt continued to complain of flank pain. Pt Cr rising from 1.4-->1.7. Urology follow up recommended due to patient's symptoms recommended transfer to [facility] for urological procedure. transfer to [place] was not done as there was no beds and therefore patient can be discharged home and fu with urology as outpatient. Pt is given tramadol 50 every 6 hours, flomax QD, and fu with urology after 1 week.

## 2022-10-29 NOTE — DISCHARGE NOTE PROVIDER - NSDCCPCAREPLAN_GEN_ALL_CORE_FT
PRINCIPAL DISCHARGE DIAGNOSIS  Diagnosis: Nephrolithiasis  Assessment and Plan of Treatment: You were admitted and managed here for Urinary tract stones and urinary tract infection.  You were noted either on arrival or during this hospitalization to have a Urinary Tract Infection. You may have already been treated and completed the antibiotics, please refer to the list of medications present on your discharge paperwork. If you notice that there are antibiotics listed, these may be to treat your infection, be sure to complete taking the full course, whether you have symptoms or not, as prescribed.  While taking antibiotics, you may benefit from taking a probiotic such as florastore to help to try and prevent an infectious type of diarrhea known as C Diff. If you notice that you begin having severe watery diarrhea, more than 4-5 episodes a day, please see your Primary Care Doctor.   It is not necessary to repeat a urine test to see if the infection is gone, it is assumed that after treatment it should have resolved. However, if you continue to have symptoms, please see your Primary Care doctor.  Please take your medications as prescribed and follow the instructions given to you regarding your health. Keep follow up appointments with your doctors as instructed.  I informed this patient of the need for further medical evaluation and care given the patient's current medical condition.   This patient refused further medical evaluation and treatment and expressed a strong desire to leave the emergency department.  I informed this patient of the benefits of further medical evaluation and care at this facility.  I informed this patient of the risks of leaving against our medical advice without properly completing our evaluation today.  These risks included illness, injury, permanent disability and even death.  I informed the patient of the possible necessity for hospital admission depending on future findings.  The patient understood the risks and benefits of further medical treatment and the possible need for admission.  The patient continued to vehemently refuse fur      SECONDARY DISCHARGE DIAGNOSES  Diagnosis: High serum lactate  Assessment and Plan of Treatment:     Diagnosis: Multiple myeloma  Assessment and Plan of Treatment:      PRINCIPAL DISCHARGE DIAGNOSIS  Diagnosis: Nephrolithiasis  Assessment and Plan of Treatment: You were admitted and managed here for Urinary tract stones and urinary tract infection.  You were noted  during this hospitalization to have a Urinary Tract Infection. You may have already been treated and completed the antibiotics, please refer to the list of medications present on your discharge paperwork. If you notice that there are antibiotics listed, these may be to treat your infection, be sure to complete taking the full course, whether you have symptoms or not, as prescribed.  Please take your medications as prescribed and follow the instructions given to you regarding your health. Keep follow up appointments with your doctors as instructed.         PRINCIPAL DISCHARGE DIAGNOSIS  Diagnosis: Nephrolithiasis  Assessment and Plan of Treatment: Take tramadol 50mg three times a day as needed for pain. Take flomax 0.8mg once a day at bedtime. Cont taking augmentin 875-125mg twice a day to complete course to treat for potential UTI. Please follow up with Dr. Henao (office number provided) within 1-2 weeks of discharge. If you need to return to ED, try to go [facility] where urology procedure can be pursued quicker.  You were admitted and managed here for Urinary tract stones and urinary tract infection.  You were noted  during this hospitalization to have a Urinary Tract Infection. You may have already been treated and completed the antibiotics, please refer to the list of medications present on your discharge paperwork. If you notice that there are antibiotics listed, these may be to treat your infection, be sure to complete taking the full course, whether you have symptoms or not, as prescribed.  Please take your medications as prescribed and follow the instructions given to you regarding your health. Keep follow up appointments with your doctors as instructed.

## 2022-10-29 NOTE — H&P ADULT - NSHPLABSRESULTS_GEN_ALL_CORE
LABS:                        13.1   13.66 )-----------( 165      ( 28 Oct 2022 21:22 )             39.5       10-28    137  |  102  |  17  ----------------------------<  177<H>  4.5   |  25  |  1.4    Ca    9.2      28 Oct 2022 21:22    TPro  7.5  /  Alb  4.4  /  TBili  0.4  /  DBili  x   /  AST  19  /  ALT  16  /  AlkPhos  95  10-28              Urinalysis Basic - ( 28 Oct 2022 23:54 )    Color: Light Yellow / Appearance: Clear / S.017 / pH: x  Gluc: x / Ketone: Trace  / Bili: Negative / Urobili: <2 mg/dL   Blood: x / Protein: Trace / Nitrite: Negative   Leuk Esterase: Negative / RBC: 14 /HPF / WBC 3 /HPF   Sq Epi: x / Non Sq Epi: 3 /HPF / Bacteria: Few            Lactate Trend  10-28 @ 21:22 Lactate:2.6       CAPILLARY BLOOD GLUCOSE      POCT Blood Glucose.: 124 mg/dL (29 Oct 2022 11:53)    RADIOLOGY:  < from: CT Abdomen and Pelvis w/ IV Cont (10.28.22 @ 22:53) >    IMPRESSION:      4 mm obstructing calculus in the proximal left ureter with associated   mild left hydroureter and hydronephrosis.    Markedly enlarged heterogeneous prostate. Partially distended urinary   bladder with questionable circumferential wall thickening which may   reflect sequela of underdistention as well as muscular hypertrophy.   Cystitis is not excluded.    Bilateral small fat-containing inguinal hernias    Other findings as above.    < end of copied text >    < from: Xray Chest 1 View- PORTABLE-Urgent (10.28.22 @ 21:28) >      IMPRESSION:    No radiographic evidence of acute cardiopulmonary disease.    < end of copied text >

## 2022-10-29 NOTE — DISCHARGE NOTE PROVIDER - NSDCQMAMI_CARD_ALL_CORE
HF Initial Call    4/25/19    EF=70%  “Do I have heart failure?” RN advised her to speak with Dr Doyle at her appt on 5/1 if she’s not aware of a HF dx. She later states she has heard the term, “Congestive HF” but asked what HF is. RN gave an overview of HF and what our program consists of to help the pt live healthier and avoid hospitalizations in the future. Initially resistant to the program but once author got her to talk, pt became more interested in learning and agreed to the HF program.   Diastolic HF, LVH, s/p AVR, anemia, aflutter, insomnia, aortic stenosis, disc disease, HTN    Preferred time for call: any  Lives with/support system: Ask with next call   Equipment at home: scale, pulse ox   Transportation: Ask with next call   Verbalizes when to call 911/when to call MD: Taught HF Action Plan  WT:    Taught wt parameters, monitoring for the other s/sx of HF, when to call MD for wt gain. O2 at 1L at night and with ambulation or PRN during the day  NYHA:  II-III without O2  PILLOWS: 2    DIET: Did not discuss low salt diet today.  FR: Did not discuss FR today  MEDS: Did not discuss medication today  Pharmacy medication review: Ask with next call   Lovenox, warfarin, metoprolol, torsemide, Norco, K+, amoxicillin, Nicorette   (not all-inclusive med list)  INR:  4/24/19=2.5    (should be maintained at 2.5 - 3.0 per MD notes 4/18).    PILL ORGANIZER: Ask with next call   ACTIVITY: Out for appointments  SMOKING: Quit 9/22/1960, however admits she vapes and also has a smoking aide. “I’m addicted to nicotine”. RN advised she shouldn’t be vaping at the same time she’s using the nicotine gum. RN asked if she’d discussed this with her MD and she states no. RN encouraged her to discuss with Dr Doyle and she states “oh I probably will”. “What can I do?” RN advised that the [place] Quit Line may be of great benefit to her with their trained counselors. Will send her the [place] [facility]uit Line phone number. RN also advised that  there are no long term studies yet on the potential harmful effects of vaping. She acknowledged the same. RN taught about the effects of nicotine on her body and encouraged her to consider cessation of nicotine products.  ALCOHOL:  Yes - not discussed today however  STREET DRUG USE: No  VACCINES: current with vaccines  ADV. DIRECTIVES:  No - pt not interested  APPTS: Dr Doyle 5/1/19  Welcome letter sent - [place] QUIT LINE phone number included, has HF folder/24hr nurse line phone number     No

## 2022-10-29 NOTE — PROGRESS NOTE ADULT - ASSESSMENT
Pt is a 71y Male Hx of Multiple myeloma admitted with Left Flank pain -  c/s for mild left hydro 2/2 4mm left proximal ureteral stone. Patient symptoms significantly improved     Plan   -Discussed options with patient including trial of passage versus cystoscopy and stent placement -- patient states that he would like to see if he can pass the stone at this time  -Patient will need stent placement should they become symptomatic such as  febrile -- will monitor for fevers  -Continue to Strain all urine  -Trend Cr -- 1.4  -Trend WBC -- 13.66  -F/u Ucx, Bcx  -Flomax if not contraindicated  - Continue Finasteride   -Pain and nausea control prn  -Monitor urine output  - No acute  intervention at this time   -Will discuss with attending   Pt is a 71y Male Hx of Multiple myeloma admitted with Left Flank pain -  c/s for mild left hydro 2/2 4mm left proximal ureteral stone. Patient symptoms significantly improved     Plan   -Discussed options with patient including trial of passage versus cystoscopy and stent placement -- patient states that he would like to see if he can pass the stone at this time  -Patient will need stent placement should they become symptomatic such as  febrile -- will monitor for fevers  -Continue to Strain all urine  -Trend Cr -- 1.4  -Trend WBC -- 13.66  -F/u Ucx, Bcx  -Flomax if not contraindicated  - Continue Finasteride   -Pain and nausea control prn  -Monitor urine output  - No acute  intervention at this time   -Will discuss with attending    ADDENDUM 10/29 7:00PM  Patient seen and examined at bedside with Dr. Henao. Patient clinically doing well, no acute  intervention.     Plan:   - Patient f/u with  outpatient for further definitive management and continue Trial of Passage   - D/C patient with strain   - Analgesics for pain control prn   - Increase hydration   - Flomax if not contraindicated   - Spoke with medical resident

## 2022-10-29 NOTE — ED ADULT NURSE REASSESSMENT NOTE - NSFALLRSKOUTCOME_ED_ALL_ED
Subjective   Chief Complaint: Decreased feeding      HPI: Patient is a 7-year-old female presents to the ER today by private vehicle with grandmother at bedside, she has a known history of CP, autism, visual impairment as well as chronic feeding disorder.  Grandmother states that approximately 3 weeks ago patient was diagnosed with COVID, she has had residual decreased feedings stating that she usually drinks 2 PediaSure's as well as 2 Gatorade through the night, last night she only drank 1, she did ingest 2-1/2 baby foods today which grandmother states she generally eats 6.  She has seen her occupational therapist within the last 2 days related to her chronic eating disorder.  She had a last wet diaper this morning and grandmother believes last bowel movement was yesterday.  She began taking oral iron drops within the last week as well.  Grandmother states that in the past with her decreased feedings she has had correlating viral illness.  Patient is difficult to assess due to comorbidities.  She is nontoxic in appearance, and per grandma at baseline mentation.    PCP: Wilder          Review of Systems   Unable to perform ROS: Patient nonverbal   Constitutional: Positive for appetite change.       Past Medical History:   Diagnosis Date   • Autistic disorder    • Cerebral palsy (HCC)        No Known Allergies    History reviewed. No pertinent surgical history.    History reviewed. No pertinent family history.    Social History     Socioeconomic History   • Marital status: Single           Objective   Physical Exam  Vitals reviewed.   Constitutional:       General: She is active.      Appearance: She is not toxic-appearing.   HENT:      Head: Normocephalic.      Right Ear: Tympanic membrane and ear canal normal.      Left Ear: Tympanic membrane and ear canal normal.      Nose: Rhinorrhea present.      Mouth/Throat:      Mouth: Mucous membranes are moist.      Pharynx: Oropharynx is clear. No oropharyngeal exudate.  "  Eyes:      Extraocular Movements: Extraocular movements intact.      Pupils: Pupils are equal, round, and reactive to light.   Cardiovascular:      Rate and Rhythm: Tachycardia present.      Pulses: Normal pulses.      Heart sounds: Normal heart sounds. No murmur heard.  Pulmonary:      Effort: No nasal flaring.      Breath sounds: Normal breath sounds. No decreased air movement.   Abdominal:      General: Bowel sounds are normal.      Palpations: Abdomen is soft.      Tenderness: There is no abdominal tenderness.   Musculoskeletal:      Cervical back: No rigidity.   Skin:     General: Skin is warm and dry.      Coloration: Skin is not pale.   Neurological:      General: No focal deficit present.      Motor: No weakness.         Procedures           ED Course      Pulse (!) 157   Temp 98 °F (36.7 °C) (Oral)   Resp 24   Ht 121.9 cm (48\")   Wt (!) 32.7 kg (72 lb)   SpO2 98%   BMI 21.97 kg/m²   Labs Reviewed   RESPIRATORY PANEL PCR W/ COVID-19 (SARS-COV-2) IDRIS/TERESA/MARGIE/PAD/COR/MAD/IAN IN-HOUSE, NP SWAB IN UTM/VTP, 3-4 HR TAT - Abnormal; Notable for the following components:       Result Value    Parainfluenza Virus 1 Detected (*)     All other components within normal limits    Narrative:     In the setting of a positive respiratory panel with a viral infection PLUS a negative procalcitonin without other underlying concern for bacterial infection, consider observing off antibiotics or discontinuation of antibiotics and continue supportive care. If the respiratory panel is positive for atypical bacterial infection (Bordetella pertussis, Chlamydophila pneumoniae, or Mycoplasma pneumoniae), consider antibiotic de-escalation to target atypical bacterial infection.   RAPID STREP A SCREEN - Normal     Medications - No data to display  No radiology results for the last day                                       MDM  Number of Diagnoses or Management Options  Decreased appetite  Viral illness  Diagnosis management " comments: While in the emergency room above evaluation was completed as well and his respiratory panel and strep testing.  Respiratory panel was positive for parainfluenza, this is consistent with grand mals reported symptoms of decreased appetite.  Patient has had liquid and fluid ingestion today and has had urine output.  She is nontoxic in appearance.  She is at her baseline mentation.  She is already being followed by psychiatry related to her decreased feeding.  Discussed the treatment of pain or fevers at home as well as continuing to offer the patient fluids and keep follow-ups with psychiatry related to feeding disorder.  Grandmother gave verbal understanding.  Child was at baseline, with patient's known autism she has sensory issues, heart rate is elevated and likely related to the aggressive movements during reevaluation.  No further questions.    I spoke with the patient at the bedside regarding their plan of care, discharge instruction, home care, prescriptions, indications to return to the emergency department, and importance follow-up.  We discussed test results at the bedside, including incidental abnormal labs, radiological findings, understands need for follow-up with primary care or specialist if indicated.     Pt is aware that discharge does not mean that nothing is wrong but it indicates no emergency is present and they must continue care with follow-up as given below or physician of their choice    Radiology Studies: None warranted for this visit.  Chart review:     Comorbidities: See above, reviewed    Differentials: Viral illness, strep, chronic feeding disorder not all inclusive of differentials considered    Appropriate PPE worn throughout the care of this patient.           Amount and/or Complexity of Data Reviewed  Clinical lab tests: reviewed    Patient Progress  Patient progress: stable      Final diagnoses:   Viral illness   Decreased appetite       ED Disposition  ED Disposition     ED  Disposition   Discharge    Condition   Stable    Comment   --             Christelle Wilder MD  [id number] [id number] [phone]    Call in 1 week  As needed         Medication List      No changes were made to your prescriptions during this visit.          Gretchen Hammonds, APRN  08/12/22 1808     Universal Safety Interventions

## 2022-10-29 NOTE — H&P ADULT - HISTORY OF PRESENT ILLNESS
Pt is a 72 yo Male with a PMH including HTN, DM, multiple myeloma, and BPH (follows w/ outside urologist in [place]) who presents to the hospital with left flank pain x 1 day. Patient states that pain began suddenly since yesterday and radiated to the LLQ abdomen/groin, though has since resolved. Reports that at time of onset, he had mild nausea without vomiting, which has also since resolved. Denies history of stones or similar previous episodes. Denies fevers, chills, chest pain, SOB, n/v, dysuria, urgency, frequency, hematuria, or other urinary symptoms, diarrhea, constipation,  LE edema, inability to ambulate.    ICU Vital Signs Last 24 Hrs  T(C): 37.2 (29 Oct 2022 07:55), Max: 37.2 (29 Oct 2022 07:55)  T(F): 98.9 (29 Oct 2022 07:55), Max: 98.9 (29 Oct 2022 07:55)  HR: 109 (29 Oct 2022 07:55) (87 - 112)  BP: 177/84 (29 Oct 2022 07:55) (175/92 - 200/98)  RR: 18 (29 Oct 2022 07:55) (18 - 20)  SpO2: 96% (29 Oct 2022 07:55) (96% - 98%)    O2 Parameters below as of 29 Oct 2022 07:55  Patient On (Oxygen Delivery Method): room air    On Labs WBC 13.6, Hgb 13.1, Cr 1.4, lactate 2.6. UA positive for blood, few bacteria and glucose.   CT abd demonstrated 4 mm obstructing calculus in the proximal left ureter with associated mild left hydroureter and hydronephrosis.Markedly enlarged heterogeneous prostate. Partially distended urinary bladder with questionable circumferential wall thickening which may reflect sequela of underdistention as well as muscular hypertrophy.   Cystitis is not excluded. Bilateral small fat-containing inguinal hernias  CXR unremarkable.           Pt is a 72 yo Male with a PMH including HTN, DM, multiple myeloma (follows Dr Campbell at [facility]), and BPH (follows w/ outside urologist in [place]) who presents to the hospital with left flank pain x 1 day. Patient states that left back pain began suddenly since 2pm yesterday and radiated to the LLQ abdomen/groin, though has since resolved. Reports that at time of onset, he had mild nausea without vomiting, which has also since resolved. Denies history of stones or similar previous episodes. Denies fevers, chills, chest pain, SOB, dysuria, urgency, frequency, hematuria, or other urinary symptoms, diarrhea, constipation,  LE edema, inability to ambulate.    ICU Vital Signs Last 24 Hrs  T(C): 37.2 (29 Oct 2022 07:55), Max: 37.2 (29 Oct 2022 07:55)  T(F): 98.9 (29 Oct 2022 07:55), Max: 98.9 (29 Oct 2022 07:55)  HR: 109 (29 Oct 2022 07:55) (87 - 112)  BP: 177/84 (29 Oct 2022 07:55) (175/92 - 200/98)  RR: 18 (29 Oct 2022 07:55) (18 - 20)  SpO2: 96% (29 Oct 2022 07:55) (96% - 98%)    O2 Parameters below as of 29 Oct 2022 07:55  Patient On (Oxygen Delivery Method): room air    On Labs WBC 13.6, Hgb 13.1, Cr 1.4, lactate 2.6. UA positive for blood, few bacteria and glucose.   CT abd demonstrated 4 mm obstructing calculus in the proximal left ureter with associated mild left hydroureter and hydronephrosis. Markedly enlarged heterogeneous prostate. Partially distended urinary bladder with questionable circumferential wall thickening which may reflect sequela of underdistention as well as muscular hypertrophy.   Cystitis is not excluded. Bilateral small fat-containing inguinal hernias  CXR unremarkable.  Patient was given 2L IV fluid bolus, was started on ceftriaxone IV. Patient was seen by Urology in ED, opted for trial of passage.           Pt is a 70 yo Male with a PMH including HTN, DM, multiple myeloma (follows Dr Campbell at [facility]), gout and BPH (follows w/ outside urologist in [place]) who presents to the hospital with left flank pain x 1 day. Patient states that left back pain began suddenly since 2pm yesterday and radiated to the LLQ abdomen/groin, though has since resolved. Reports that at time of onset, he had mild nausea without vomiting, which has also since resolved. Denies history of stones or similar previous episodes. Denies fevers, chills, chest pain, SOB, dysuria, urgency, frequency, hematuria, or other urinary symptoms, diarrhea, constipation,  LE edema, inability to ambulate.    ICU Vital Signs Last 24 Hrs  T(C): 37.2 (29 Oct 2022 07:55), Max: 37.2 (29 Oct 2022 07:55)  T(F): 98.9 (29 Oct 2022 07:55), Max: 98.9 (29 Oct 2022 07:55)  HR: 109 (29 Oct 2022 07:55) (87 - 112)  BP: 177/84 (29 Oct 2022 07:55) (175/92 - 200/98)  RR: 18 (29 Oct 2022 07:55) (18 - 20)  SpO2: 96% (29 Oct 2022 07:55) (96% - 98%)    O2 Parameters below as of 29 Oct 2022 07:55  Patient On (Oxygen Delivery Method): room air    On Labs WBC 13.6, Hgb 13.1, Cr 1.4, lactate 2.6. UA positive for blood, few bacteria and glucose.   CT abd demonstrated 4 mm obstructing calculus in the proximal left ureter with associated mild left hydroureter and hydronephrosis. Markedly enlarged heterogeneous prostate. Partially distended urinary bladder with questionable circumferential wall thickening which may reflect sequela of underdistention as well as muscular hypertrophy.   Cystitis is not excluded. Bilateral small fat-containing inguinal hernias  CXR unremarkable.  Patient was given 2L IV fluid bolus, was started on ceftriaxone IV. Patient was seen by Urology in ED, opted for trial of passage.

## 2022-10-30 LAB
ALBUMIN SERPL ELPH-MCNC: 3.7 G/DL — SIGNIFICANT CHANGE UP (ref 3.5–5.2)
ALP SERPL-CCNC: 79 U/L — SIGNIFICANT CHANGE UP (ref 30–115)
ALT FLD-CCNC: 11 U/L — SIGNIFICANT CHANGE UP (ref 0–41)
ANION GAP SERPL CALC-SCNC: 12 MMOL/L — SIGNIFICANT CHANGE UP (ref 7–14)
ANION GAP SERPL CALC-SCNC: 13 MMOL/L — SIGNIFICANT CHANGE UP (ref 7–14)
AST SERPL-CCNC: 18 U/L — SIGNIFICANT CHANGE UP (ref 0–41)
BASOPHILS # BLD AUTO: 0.02 K/UL — SIGNIFICANT CHANGE UP (ref 0–0.2)
BASOPHILS NFR BLD AUTO: 0.2 % — SIGNIFICANT CHANGE UP (ref 0–1)
BILIRUB SERPL-MCNC: 0.4 MG/DL — SIGNIFICANT CHANGE UP (ref 0.2–1.2)
BUN SERPL-MCNC: 20 MG/DL — SIGNIFICANT CHANGE UP (ref 10–20)
BUN SERPL-MCNC: 23 MG/DL — HIGH (ref 10–20)
CALCIUM SERPL-MCNC: 8.4 MG/DL — SIGNIFICANT CHANGE UP (ref 8.4–10.5)
CALCIUM SERPL-MCNC: 8.4 MG/DL — SIGNIFICANT CHANGE UP (ref 8.4–10.5)
CHLORIDE SERPL-SCNC: 103 MMOL/L — SIGNIFICANT CHANGE UP (ref 98–110)
CHLORIDE SERPL-SCNC: 112 MMOL/L — HIGH (ref 98–110)
CO2 SERPL-SCNC: 21 MMOL/L — SIGNIFICANT CHANGE UP (ref 17–32)
CO2 SERPL-SCNC: 22 MMOL/L — SIGNIFICANT CHANGE UP (ref 17–32)
CREAT SERPL-MCNC: 1.6 MG/DL — HIGH (ref 0.7–1.5)
CREAT SERPL-MCNC: 1.7 MG/DL — HIGH (ref 0.7–1.5)
EGFR: 43 ML/MIN/1.73M2 — LOW
EGFR: 46 ML/MIN/1.73M2 — LOW
EOSINOPHIL # BLD AUTO: 0 K/UL — SIGNIFICANT CHANGE UP (ref 0–0.7)
EOSINOPHIL NFR BLD AUTO: 0 % — SIGNIFICANT CHANGE UP (ref 0–8)
GLUCOSE BLDC GLUCOMTR-MCNC: 146 MG/DL — HIGH (ref 70–99)
GLUCOSE BLDC GLUCOMTR-MCNC: 152 MG/DL — HIGH (ref 70–99)
GLUCOSE BLDC GLUCOMTR-MCNC: 176 MG/DL — HIGH (ref 70–99)
GLUCOSE SERPL-MCNC: 134 MG/DL — HIGH (ref 70–99)
GLUCOSE SERPL-MCNC: 170 MG/DL — HIGH (ref 70–99)
HCT VFR BLD CALC: 32.6 % — LOW (ref 42–52)
HGB BLD-MCNC: 11.1 G/DL — LOW (ref 14–18)
IMM GRANULOCYTES NFR BLD AUTO: 0.6 % — HIGH (ref 0.1–0.3)
LYMPHOCYTES # BLD AUTO: 0.96 K/UL — LOW (ref 1.2–3.4)
LYMPHOCYTES # BLD AUTO: 9.7 % — LOW (ref 20.5–51.1)
MAGNESIUM SERPL-MCNC: 1.9 MG/DL — SIGNIFICANT CHANGE UP (ref 1.8–2.4)
MCHC RBC-ENTMCNC: 30 PG — SIGNIFICANT CHANGE UP (ref 27–31)
MCHC RBC-ENTMCNC: 34 G/DL — SIGNIFICANT CHANGE UP (ref 32–37)
MCV RBC AUTO: 88.1 FL — SIGNIFICANT CHANGE UP (ref 80–94)
MONOCYTES # BLD AUTO: 0.5 K/UL — SIGNIFICANT CHANGE UP (ref 0.1–0.6)
MONOCYTES NFR BLD AUTO: 5.1 % — SIGNIFICANT CHANGE UP (ref 1.7–9.3)
NEUTROPHILS # BLD AUTO: 8.35 K/UL — HIGH (ref 1.4–6.5)
NEUTROPHILS NFR BLD AUTO: 84.4 % — HIGH (ref 42.2–75.2)
NRBC # BLD: 0 /100 WBCS — SIGNIFICANT CHANGE UP (ref 0–0)
PLATELET # BLD AUTO: 130 K/UL — SIGNIFICANT CHANGE UP (ref 130–400)
POTASSIUM SERPL-MCNC: 4.2 MMOL/L — SIGNIFICANT CHANGE UP (ref 3.5–5)
POTASSIUM SERPL-MCNC: 4.3 MMOL/L — SIGNIFICANT CHANGE UP (ref 3.5–5)
POTASSIUM SERPL-SCNC: 4.2 MMOL/L — SIGNIFICANT CHANGE UP (ref 3.5–5)
POTASSIUM SERPL-SCNC: 4.3 MMOL/L — SIGNIFICANT CHANGE UP (ref 3.5–5)
PROT SERPL-MCNC: 6.3 G/DL — SIGNIFICANT CHANGE UP (ref 6–8)
RBC # BLD: 3.7 M/UL — LOW (ref 4.7–6.1)
RBC # FLD: 15 % — HIGH (ref 11.5–14.5)
SODIUM SERPL-SCNC: 137 MMOL/L — SIGNIFICANT CHANGE UP (ref 135–146)
SODIUM SERPL-SCNC: 146 MMOL/L — SIGNIFICANT CHANGE UP (ref 135–146)
WBC # BLD: 9.89 K/UL — SIGNIFICANT CHANGE UP (ref 4.8–10.8)
WBC # FLD AUTO: 9.89 K/UL — SIGNIFICANT CHANGE UP (ref 4.8–10.8)

## 2022-10-30 PROCEDURE — 99232 SBSQ HOSP IP/OBS MODERATE 35: CPT

## 2022-10-30 RX ORDER — KETOROLAC TROMETHAMINE 30 MG/ML
15 SYRINGE (ML) INJECTION EVERY 6 HOURS
Refills: 0 | Status: DISCONTINUED | OUTPATIENT
Start: 2022-10-30 | End: 2022-10-31

## 2022-10-30 RX ORDER — ZOLPIDEM TARTRATE 10 MG/1
5 TABLET ORAL ONCE
Refills: 0 | Status: DISCONTINUED | OUTPATIENT
Start: 2022-10-30 | End: 2022-10-30

## 2022-10-30 RX ORDER — TRAMADOL HYDROCHLORIDE 50 MG/1
25 TABLET ORAL ONCE
Refills: 0 | Status: DISCONTINUED | OUTPATIENT
Start: 2022-10-30 | End: 2022-10-30

## 2022-10-30 RX ORDER — LANOLIN ALCOHOL/MO/W.PET/CERES
5 CREAM (GRAM) TOPICAL ONCE
Refills: 0 | Status: COMPLETED | OUTPATIENT
Start: 2022-10-30 | End: 2022-10-30

## 2022-10-30 RX ADMIN — LOSARTAN POTASSIUM 100 MILLIGRAM(S): 100 TABLET, FILM COATED ORAL at 05:58

## 2022-10-30 RX ADMIN — TRAMADOL HYDROCHLORIDE 25 MILLIGRAM(S): 50 TABLET ORAL at 06:41

## 2022-10-30 RX ADMIN — TAMSULOSIN HYDROCHLORIDE 0.8 MILLIGRAM(S): 0.4 CAPSULE ORAL at 21:00

## 2022-10-30 RX ADMIN — FINASTERIDE 5 MILLIGRAM(S): 5 TABLET, FILM COATED ORAL at 11:23

## 2022-10-30 RX ADMIN — HEPARIN SODIUM 5000 UNIT(S): 5000 INJECTION INTRAVENOUS; SUBCUTANEOUS at 18:29

## 2022-10-30 RX ADMIN — TRAMADOL HYDROCHLORIDE 25 MILLIGRAM(S): 50 TABLET ORAL at 05:58

## 2022-10-30 RX ADMIN — SODIUM CHLORIDE 100 MILLILITER(S): 9 INJECTION INTRAMUSCULAR; INTRAVENOUS; SUBCUTANEOUS at 01:35

## 2022-10-30 RX ADMIN — ZOLPIDEM TARTRATE 5 MILLIGRAM(S): 10 TABLET ORAL at 02:44

## 2022-10-30 RX ADMIN — Medication 100 MILLIGRAM(S): at 05:58

## 2022-10-30 RX ADMIN — SODIUM CHLORIDE 100 MILLILITER(S): 9 INJECTION INTRAMUSCULAR; INTRAVENOUS; SUBCUTANEOUS at 21:04

## 2022-10-30 RX ADMIN — Medication 15 MILLIGRAM(S): at 15:49

## 2022-10-30 RX ADMIN — Medication 650 MILLIGRAM(S): at 02:42

## 2022-10-30 RX ADMIN — Medication 300 MILLIGRAM(S): at 11:23

## 2022-10-30 RX ADMIN — CEFTRIAXONE 100 MILLIGRAM(S): 500 INJECTION, POWDER, FOR SOLUTION INTRAMUSCULAR; INTRAVENOUS at 13:14

## 2022-10-30 RX ADMIN — Medication 650 MILLIGRAM(S): at 01:35

## 2022-10-30 NOTE — CHART NOTE - NSCHARTNOTEFT_GEN_A_CORE
Case discussed with Dr. Henao and patient, patient still with persistent pain at this time, relieved with pain management. Discussed to proceed with OR procedure cystoscopy, possible Left double J stent placement with Dr. Nielson at [facility] as an add on.     Plan:   - Discussed case with Medical team for Transfer to [facility]   - Keep NPO after MN   - AM labs for procedure   - Continue IVF   - Analgesics for pain control   - Continue Finasteride and Flomax   - spoke with [facility] PA x [phone], Bed management (bed available) Case discussed with Dr. Henao and patient, patient had persistent pain today relieved with pain management. Discussed to proceed with OR procedure cystoscopy, possible Left double J stent placement with Dr. Nielson at [facility] as an add on. He is considering this     Plan:   - Discussed case with Medical team for Transfer to [facility]   - Keep NPO after MN   - AM labs for procedure   - Continue IVF   - Analgesics for pain control   - Continue Finasteride and Flomax   - spoke with [facility] PA x [phone], Bed management (bed available)

## 2022-10-30 NOTE — CHART NOTE - NSCHARTNOTEFT_GEN_A_CORE
Transfer Note    Transfer from: [facility]  Transfer to: [facility]      ED COURSE:    Pt is a 72 yo Male with a PMH including HTN, DM, multiple myeloma (follows Dr Campbell at [facility]), gout and BPH (follows w/ outside urologist in [place]) who presents to the hospital with left flank pain x 1 day. Patient states that left back pain began suddenly since 2pm yesterday and radiated to the LLQ abdomen/groin, though has since resolved. Reports that at time of onset, he had mild nausea without vomiting, which has also since resolved. Denies history of stones or similar previous episodes. Denies fevers, chills, chest pain, SOB, dysuria, urgency, frequency, hematuria, or other urinary symptoms, diarrhea, constipation,  LE edema, inability to ambulate.    ICU Vital Signs Last 24 Hrs  T(C): 37.2 (29 Oct 2022 07:55), Max: 37.2 (29 Oct 2022 07:55)  T(F): 98.9 (29 Oct 2022 07:55), Max: 98.9 (29 Oct 2022 07:55)  HR: 109 (29 Oct 2022 07:55) (87 - 112)  BP: 177/84 (29 Oct 2022 07:55) (175/92 - 200/98)  RR: 18 (29 Oct 2022 07:55) (18 - 20)  SpO2: 96% (29 Oct 2022 07:55) (96% - 98%)    O2 Parameters below as of 29 Oct 2022 07:55  Patient On (Oxygen Delivery Method): room air    On Labs WBC 13.6, Hgb 13.1, Cr 1.4, lactate 2.6. UA positive for blood, few bacteria and glucose.   CT abd demonstrated 4 mm obstructing calculus in the proximal left ureter with associated mild left hydroureter and hydronephrosis. Markedly enlarged heterogeneous prostate. Partially distended urinary bladder with questionable circumferential wall thickening which may reflect sequela of underdistention as well as muscular hypertrophy.   Cystitis is not excluded. Bilateral small fat-containing inguinal hernias  CXR unremarkable.  Patient was given 2L IV fluid bolus, was started on ceftriaxone IV. Patient was seen by Urology in ED, opted for trial of passage.      Pt admitted to medicine for L Flank pain    HOSPITAL COURSE:    Urology was consulted, recommended increased hydration and tp d/c pt with strain to monitor stone passage.    Pt continued to complain of flank pain. Pt Cr rising from 1.4-->1.7. Urology follow up recommended due to patient's symptoms recommended transfer to [facility] for urological procedure.    For Follow-Up:    **AM PREOP LABS, NPO AFTER MIDNIGHT***      Vital Signs Last 24 Hrs  T(C): 37.2 (30 Oct 2022 15:51), Max: 37.4 (29 Oct 2022 20:18)  T(F): 98.9 (30 Oct 2022 15:51), Max: 99.3 (29 Oct 2022 20:18)  HR: 102 (30 Oct 2022 15:51) (88 - 102)  BP: 215/114 (30 Oct 2022 15:51) (133/71 - 215/114)  BP(mean): --  RR: 18 (30 Oct 2022 15:51) (18 - 18)  SpO2: 97% (30 Oct 2022 15:51) (97% - 99%)    Parameters below as of 30 Oct 2022 15:51  Patient On (Oxygen Delivery Method): room air      I&O's Summary        MEDICATIONS  (STANDING):  allopurinol 300 milliGRAM(s) Oral daily  cefTRIAXone   IVPB 1000 milliGRAM(s) IV Intermittent every 24 hours  finasteride 5 milliGRAM(s) Oral daily  heparin   Injectable 5000 Unit(s) SubCutaneous every 12 hours  losartan 100 milliGRAM(s) Oral daily  metoprolol succinate  milliGRAM(s) Oral daily  sodium chloride 0.9%. 1000 milliLiter(s) (100 mL/Hr) IV Continuous <Continuous>  tamsulosin 0.8 milliGRAM(s) Oral at bedtime    MEDICATIONS  (PRN):  acetaminophen     Tablet .. 650 milliGRAM(s) Oral every 6 hours PRN Temp greater or equal to 38C (100.4F), Moderate Pain (4 - 6)  ketorolac   Injectable 15 milliGRAM(s) IV Push every 6 hours PRN Severe Pain (7 - 10)  ondansetron Injectable 4 milliGRAM(s) IV Push three times a day PRN Nausea and/or Vomiting        LABS                                            11.1                  Neurophils% (auto):   84.4   (10-30 @ 07:15):    9.89 )-----------(130          Lymphocytes% (auto):  9.7                                           32.6                   Eosinphils% (auto):   0.0      Manual%: Neutrophils x    ; Lymphocytes x    ; Eosinophils x    ; Bands%: x    ; Blasts x                                    137    |  103    |  20                  Calcium: 8.4   / iCa: x      (10-30 @ 16:10)    ----------------------------<  134       Magnesium: x                                4.2     |  21     |  1.6              Phosphorous: x        TPro  6.3    /  Alb  3.7    /  TBili  0.4    /  DBili  x      /  AST  18     /  ALT  11     /  AlkPhos  79     30 Oct 2022 07:15

## 2022-10-30 NOTE — PROGRESS NOTE ADULT - ASSESSMENT
70 yo Male with a PMH including HTN, DM, multiple myeloma (follows Dr Campbell at [facility]), gout and BPH (follows w/ outside urologist in [place]) who presents to the hospital with left flank pain x 1 day. Pt was found to have 4 mm obstructing calculus in the proximal left ureter with associated mild left hydroureter and hydronephrosis.    A/P:   Nephrolithiasis:   Left flank pain secondary to left 4 mm obstructing calculus with mild left hydroureter and hydronephrosis  Abdomen CT showed 4 mm obstructing calculus in the proximal left ureter with associated mild left hydroureter and hydronephrosis. Markedly enlarged heterogeneous prostate.   UA positive for blood, no WBC.   WBC 13.6,  lactate 2.6. Cr 1.4  Seen by urology, recommended IV hydration, urine strain. May need for cystoscopy and ureteral stent if patient became febrile.   Continue IV fluid, pain control with Toradol. Continue Flomax  Continue Rocephin.     Acute Kidney Injury: Cr increased to 1.7,   Continue IV fluid. If Cr is worsening will hold ARBs.     HTN   metoprolol succinate 100mg QD, on olmesartan 40 mg QD at home( losartan while hospitalised)    Multiple Myeloma:   Patient reports he follows Dr. Campbell in [facility]> planned for chemotherapy in May 2023  - OP follow up    DM type 2:   on metformin at home, on hold.   Continue Sliding scale.     Gout: Continue Allopurinol    DVT Prophylaxis: heparin subQ  Code Status: full code  Possible discharge in 24 hrs if abdominal pain improved.

## 2022-10-31 VITALS
HEART RATE: 103 BPM | OXYGEN SATURATION: 97 % | DIASTOLIC BLOOD PRESSURE: 75 MMHG | RESPIRATION RATE: 20 BRPM | TEMPERATURE: 98 F | SYSTOLIC BLOOD PRESSURE: 130 MMHG

## 2022-10-31 LAB
ALBUMIN SERPL ELPH-MCNC: 3.8 G/DL — SIGNIFICANT CHANGE UP (ref 3.5–5.2)
ALP SERPL-CCNC: 70 U/L — SIGNIFICANT CHANGE UP (ref 30–115)
ALT FLD-CCNC: 11 U/L — SIGNIFICANT CHANGE UP (ref 0–41)
ANION GAP SERPL CALC-SCNC: 7 MMOL/L — SIGNIFICANT CHANGE UP (ref 7–14)
APTT BLD: 26 SEC — LOW (ref 27–39.2)
AST SERPL-CCNC: 17 U/L — SIGNIFICANT CHANGE UP (ref 0–41)
BILIRUB SERPL-MCNC: 0.5 MG/DL — SIGNIFICANT CHANGE UP (ref 0.2–1.2)
BLD GP AB SCN SERPL QL: SIGNIFICANT CHANGE UP
BUN SERPL-MCNC: 20 MG/DL — SIGNIFICANT CHANGE UP (ref 10–20)
CALCIUM SERPL-MCNC: 8.4 MG/DL — SIGNIFICANT CHANGE UP (ref 8.4–10.5)
CHLORIDE SERPL-SCNC: 105 MMOL/L — SIGNIFICANT CHANGE UP (ref 98–110)
CO2 SERPL-SCNC: 25 MMOL/L — SIGNIFICANT CHANGE UP (ref 17–32)
CREAT SERPL-MCNC: 1.3 MG/DL — SIGNIFICANT CHANGE UP (ref 0.7–1.5)
CULTURE RESULTS: SIGNIFICANT CHANGE UP
EGFR: 59 ML/MIN/1.73M2 — LOW
GLUCOSE BLDC GLUCOMTR-MCNC: 127 MG/DL — HIGH (ref 70–99)
GLUCOSE BLDC GLUCOMTR-MCNC: 129 MG/DL — HIGH (ref 70–99)
GLUCOSE SERPL-MCNC: 144 MG/DL — HIGH (ref 70–99)
HCT VFR BLD CALC: 33.2 % — LOW (ref 42–52)
HCV AB S/CO SERPL IA: 0.04 COI — SIGNIFICANT CHANGE UP
HCV AB SERPL-IMP: SIGNIFICANT CHANGE UP
HGB BLD-MCNC: 10.8 G/DL — LOW (ref 14–18)
INR BLD: 1.25 RATIO — SIGNIFICANT CHANGE UP (ref 0.65–1.3)
MAGNESIUM SERPL-MCNC: 1.9 MG/DL — SIGNIFICANT CHANGE UP (ref 1.8–2.4)
MCHC RBC-ENTMCNC: 29.2 PG — SIGNIFICANT CHANGE UP (ref 27–31)
MCHC RBC-ENTMCNC: 32.5 G/DL — SIGNIFICANT CHANGE UP (ref 32–37)
MCV RBC AUTO: 89.7 FL — SIGNIFICANT CHANGE UP (ref 80–94)
NRBC # BLD: 0 /100 WBCS — SIGNIFICANT CHANGE UP (ref 0–0)
PLATELET # BLD AUTO: 126 K/UL — LOW (ref 130–400)
POTASSIUM SERPL-MCNC: 4.1 MMOL/L — SIGNIFICANT CHANGE UP (ref 3.5–5)
POTASSIUM SERPL-SCNC: 4.1 MMOL/L — SIGNIFICANT CHANGE UP (ref 3.5–5)
PROT SERPL-MCNC: 6 G/DL — SIGNIFICANT CHANGE UP (ref 6–8)
PROTHROM AB SERPL-ACNC: 14.3 SEC — HIGH (ref 9.95–12.87)
RBC # BLD: 3.7 M/UL — LOW (ref 4.7–6.1)
RBC # FLD: 14.9 % — HIGH (ref 11.5–14.5)
SODIUM SERPL-SCNC: 137 MMOL/L — SIGNIFICANT CHANGE UP (ref 135–146)
SPECIMEN SOURCE: SIGNIFICANT CHANGE UP
WBC # BLD: 7.51 K/UL — SIGNIFICANT CHANGE UP (ref 4.8–10.8)
WBC # FLD AUTO: 7.51 K/UL — SIGNIFICANT CHANGE UP (ref 4.8–10.8)

## 2022-10-31 PROCEDURE — 93010 ELECTROCARDIOGRAM REPORT: CPT

## 2022-10-31 PROCEDURE — 99232 SBSQ HOSP IP/OBS MODERATE 35: CPT

## 2022-10-31 RX ORDER — TRAMADOL HYDROCHLORIDE 50 MG/1
1 TABLET ORAL
Qty: 56 | Refills: 0
Start: 2022-10-31 | End: 2022-11-13

## 2022-10-31 RX ORDER — TAMSULOSIN HYDROCHLORIDE 0.4 MG/1
2 CAPSULE ORAL
Qty: 60 | Refills: 0
Start: 2022-10-31 | End: 2022-11-29

## 2022-10-31 RX ORDER — HYDRALAZINE HCL 50 MG
50 TABLET ORAL ONCE
Refills: 0 | Status: COMPLETED | OUTPATIENT
Start: 2022-10-31 | End: 2022-10-31

## 2022-10-31 RX ADMIN — FINASTERIDE 5 MILLIGRAM(S): 5 TABLET, FILM COATED ORAL at 11:46

## 2022-10-31 RX ADMIN — HEPARIN SODIUM 5000 UNIT(S): 5000 INJECTION INTRAVENOUS; SUBCUTANEOUS at 05:30

## 2022-10-31 RX ADMIN — LOSARTAN POTASSIUM 100 MILLIGRAM(S): 100 TABLET, FILM COATED ORAL at 05:30

## 2022-10-31 RX ADMIN — SODIUM CHLORIDE 100 MILLILITER(S): 9 INJECTION INTRAMUSCULAR; INTRAVENOUS; SUBCUTANEOUS at 11:47

## 2022-10-31 RX ADMIN — Medication 100 MILLIGRAM(S): at 05:43

## 2022-10-31 RX ADMIN — Medication 50 MILLIGRAM(S): at 11:46

## 2022-10-31 RX ADMIN — SODIUM CHLORIDE 100 MILLILITER(S): 9 INJECTION INTRAMUSCULAR; INTRAVENOUS; SUBCUTANEOUS at 06:17

## 2022-10-31 RX ADMIN — Medication 15 MILLIGRAM(S): at 07:59

## 2022-10-31 RX ADMIN — Medication 300 MILLIGRAM(S): at 11:46

## 2022-10-31 RX ADMIN — CEFTRIAXONE 100 MILLIGRAM(S): 500 INJECTION, POWDER, FOR SOLUTION INTRAMUSCULAR; INTRAVENOUS at 13:17

## 2022-10-31 NOTE — PROGRESS NOTE ADULT - REASON FOR ADMISSION
left hydronephrosis, proximal ureteral stone.

## 2022-10-31 NOTE — PROGRESS NOTE ADULT - ASSESSMENT
70 yo Male with a PMH including HTN, DM, multiple myeloma (follows Dr Campbell at [facility]), gout and BPH (follows w/ outside urologist in [place]) who presents to the hospital with left flank pain x 1 day. Pt was found to have 4 mm obstructing calculus in the proximal left ureter with associated mild left hydroureter and hydronephrosis.    #Left obstructing nephrolithiasis  4mm stone identified on CT  - Per urology f/u, plan for outpatient f/u for potential cystoscopy. May DC pt on flomax and tramadol for pain control. Pt to continue straining urine    # HTN  - BP: 177/84 (29 Oct 2022 07:55) (175/92 - 200/98)  - restart home medications: metoprolol succinate 100mg QD, on olmesartan 40 mg QD at home( losartan while hospitalised)    # MM  - patient reports he follows Dr. Campbell in [facility]> planned for chemotherapy in May 2023  - OP follow up    # DM  - on metformin at home  - hold oral meds;    - check fs;  start insulin basal, bolus, s/s prn if finger stick glucose >180 mg persistently    # gout  - c/w allopurinol    DVT PPX, heparin    DC today

## 2022-10-31 NOTE — PROGRESS NOTE ADULT - SUBJECTIVE AND OBJECTIVE BOX
Progress Note    Subjective Pt seen and examined at bedside   70 y/o Male with a left 4x3 proximal ureteral stone. Pt doing better,  no pain at this time, he had pain 2 hrs ago. Pt wants to have procedure done.    [ x ] a 10 point review of systems was negative except where noted    Vital signs  T(C): , Max: 37.2 (10-30-22 @ 15:51)  HR: 94 (10-31-22 @ 08:10)  BP: 197/102 (10-31-22 @ 08:10)  SpO2: 98% (10-31-22 @ 08:10)    Gen NC/AT in NAD  SKIN warm, dry with good turgor  Neck supple, FROM  LUNGS No dyspnea, No accessory muscle use  BACK No CVAT  ABD    Soft non tender, bladder not palpable   voiding freely      Labs                        10.8   7.51  )-----------( 126      ( 31 Oct 2022 07:26 )             33.2     31 Oct 2022 07:26    137    |  105    |  20     ----------------------------<  144    4.1     |  25     |  1.3      Ca    8.4        31 Oct 2022 07:26  Mg     1.9       31 Oct 2022 07:26                                
UROLOGY DAILY PROGRESS NOTE    70 yo Male admitted with Left Flank pain -  c/s for mild left hydro 2/2 4mm left proximal ureteral stone. Patient seen and examined at bedside. Patient was planned for discharge last night; however, was having pain this AM he was subsequently given Tramdol. He reports the medication made him "out of it". Currently states his pain is 6-7/10 localized to LUQ. Denies any fever, chills, SOB/difficulty breathing, N/V.     MEDICATIONS  (STANDING):  allopurinol 300 milliGRAM(s) Oral daily  cefTRIAXone   IVPB 1000 milliGRAM(s) IV Intermittent every 24 hours  finasteride 5 milliGRAM(s) Oral daily  heparin   Injectable 5000 Unit(s) SubCutaneous every 12 hours  losartan 100 milliGRAM(s) Oral daily  metoprolol succinate  milliGRAM(s) Oral daily  sodium chloride 0.9%. 1000 milliLiter(s) (100 mL/Hr) IV Continuous <Continuous>  tamsulosin 0.8 milliGRAM(s) Oral at bedtime    MEDICATIONS  (PRN):  acetaminophen     Tablet .. 650 milliGRAM(s) Oral every 6 hours PRN Temp greater or equal to 38C (100.4F), Moderate Pain (4 - 6)  ketorolac   Injectable 15 milliGRAM(s) IV Push every 6 hours PRN Severe Pain (7 - 10)  ondansetron Injectable 4 milliGRAM(s) IV Push three times a day PRN Nausea and/or Vomiting      REVIEW OF SYSTEMS   [x] A ten-point review of systems was otherwise negative except as noted.  [ ] Due to altered mental status/intubation, subjective information were not able to be obtained from patient. History was obtained, to the extent possible, from review of the chart and collateral sources of information.  Vital Signs Last 24 Hrs  T(C): 37 (30 Oct 2022 07:41), Max: 37.4 (29 Oct 2022 20:18)  T(F): 98.6 (30 Oct 2022 07:41), Max: 99.3 (29 Oct 2022 20:18)  HR: 98 (30 Oct 2022 07:41) (88 - 98)  BP: 190/91 (30 Oct 2022 07:41) (133/71 - 193/94)  BP(mean): --  RR: 18 (30 Oct 2022 07:41) (18 - 18)  SpO2: 97% (30 Oct 2022 07:41) (95% - 99%)    Parameters below as of 30 Oct 2022 07:41  Patient On (Oxygen Delivery Method): room air        PHYSICAL EXAM:    GEN: NAD, well-developed, awake and alert.  SKIN: Good color, non diaphoretic.  HEENT: NC/AT.  RESP: No dyspnea, non-labored breathing. No use of accessory muscles.  CARDIO: +S1/S2  ABDO: Soft, NT/ND, no palpable bladder, no suprapubic tenderness.  BACK: mild LCVAT   : +Patient voids freely     I&O's Summary      LABS:                        11.1   9.89  )-----------( 130      ( 30 Oct 2022 07:15 )             32.6     10-30    146  |  112<H>  |  23<H>  ----------------------------<  170<H>  4.3   |  22  |  1.7<H>    Ca    8.4      30 Oct 2022 07:15  Mg     1.9     10-30    TPro  6.3  /  Alb  3.7  /  TBili  0.4  /  DBili  x   /  AST  18  /  ALT  11  /  AlkPhos  79  10-30      Urinalysis Basic - ( 28 Oct 2022 23:54 )    Color: Light Yellow / Appearance: Clear / S.017 / pH: x  Gluc: x / Ketone: Trace  / Bili: Negative / Urobili: <2 mg/dL   Blood: x / Protein: Trace / Nitrite: Negative   Leuk Esterase: Negative / RBC: 14 /HPF / WBC 3 /HPF   Sq Epi: x / Non Sq Epi: 3 /HPF / Bacteria: Few            RADIOLOGY & ADDITIONAL STUDIES:
UROLOGY DAILY PROGRESS NOTE    Pt is a 71y Male admitted with Left Flank pain -  c/s for mild left hydro 2/2 4mm left proximal ureteral stone. Patient seen and examined at bedside with wife. Patient reports symptoms were significantly improved reports 0/10 pain. Patient denies any fever, chills, N/V, SOB/difficulty voiding, abdominal pain, flank pain, urgency, frequency. No acute overnight events.     MEDICATIONS  (STANDING):  allopurinol 300 milliGRAM(s) Oral daily  cefTRIAXone   IVPB 1000 milliGRAM(s) IV Intermittent every 24 hours  finasteride 5 milliGRAM(s) Oral daily  heparin   Injectable 5000 Unit(s) SubCutaneous every 12 hours  losartan 100 milliGRAM(s) Oral daily  metoprolol succinate  milliGRAM(s) Oral daily  sodium chloride 0.9%. 1000 milliLiter(s) (100 mL/Hr) IV Continuous <Continuous>  tamsulosin 0.8 milliGRAM(s) Oral at bedtime    MEDICATIONS  (PRN):  acetaminophen     Tablet .. 650 milliGRAM(s) Oral every 6 hours PRN Temp greater or equal to 38C (100.4F), Moderate Pain (4 - 6)  ondansetron Injectable 4 milliGRAM(s) IV Push three times a day PRN Nausea and/or Vomiting      REVIEW OF SYSTEMS   [x] A ten-point review of systems was otherwise negative except as noted.    Vital Signs Last 24 Hrs  T(C): 37.2 (29 Oct 2022 07:55), Max: 37.2 (29 Oct 2022 07:55)  T(F): 98.9 (29 Oct 2022 07:55), Max: 98.9 (29 Oct 2022 07:55)  HR: 109 (29 Oct 2022 07:55) (87 - 112)  BP: 177/84 (29 Oct 2022 07:55) (175/92 - 200/98)  RR: 18 (29 Oct 2022 07:55) (18 - 20)  SpO2: 96% (29 Oct 2022 07:55) (96% - 98%)    Parameters below as of 29 Oct 2022 07:55  Patient On (Oxygen Delivery Method): room air        PHYSICAL EXAM:    GEN: NAD, well-developed, awake and alert.  SKIN: Good color, non diaphoretic.  HEENT: NC/AT.  RESP: No dyspnea, non-labored breathing. No use of accessory muscles.  CARDIO: +S1/S2  ABDO: Soft, NT/ND, no palpable bladder, no suprapubic tenderness  BACK: No CVAT B/L  : Patient voiding freely       I&O's Summary      LABS:                        13.1   13.66 )-----------( 165      ( 28 Oct 2022 21:22 )             39.5     10-28    137  |  102  |  17  ----------------------------<  177<H>  4.5   |  25  |  1.4    Ca    9.2      28 Oct 2022 21:22    TPro  7.5  /  Alb  4.4  /  TBili  0.4  /  DBili  x   /  AST  19  /  ALT  16  /  AlkPhos  95  10-28      Urinalysis Basic - ( 28 Oct 2022 23:54 )    Color: Light Yellow / Appearance: Clear / S.017 / pH: x  Gluc: x / Ketone: Trace  / Bili: Negative / Urobili: <2 mg/dL   Blood: x / Protein: Trace / Nitrite: Negative   Leuk Esterase: Negative / RBC: 14 /HPF / WBC 3 /HPF   Sq Epi: x / Non Sq Epi: 3 /HPF / Bacteria: Few            RADIOLOGY & ADDITIONAL STUDIES:  < from: CT Abdomen and Pelvis w/ IV Cont (10.28.22 @ 22:53) >  IMPRESSION:      4 mm obstructing calculus in the proximal left ureter with associated   mild left hydroureter and hydronephrosis.    Markedly enlarged heterogeneous prostate. Partially distended urinary   bladder with questionable circumferential wall thickening which may   reflect sequela of underdistention as well as muscular hypertrophy.   Cystitis is not excluded.    Bilateral small fat-containing inguinal hernias    Other findings as above.    --- End of Report ---            MARILIA ESCOBEDO MD; Attending Radiologist  This document has been electronically signed. Oct 28 2022 11:37PM    < end of copied text >  
  DAVINATAMMYADAM SIDDHARTHA  71y  Male      Patient is a 71y old  Male who presents with a chief complaint of left hydronephrosis, proximal ureteral stone. (29 Oct 2022 19:44)      INTERVAL HPI/OVERNIGHT EVENTS:  He is still with intermittent left flank pain, no fever.   Vital Signs Last 24 Hrs  T(C): 37 (30 Oct 2022 07:41), Max: 37.4 (29 Oct 2022 20:18)  T(F): 98.6 (30 Oct 2022 07:41), Max: 99.3 (29 Oct 2022 20:18)  HR: 98 (30 Oct 2022 07:41) (88 - 98)  BP: 190/91 (30 Oct 2022 07:41) (133/71 - 193/94)  BP(mean): --  RR: 18 (30 Oct 2022 07:41) (18 - 18)  SpO2: 97% (30 Oct 2022 07:41) (95% - 99%)    Parameters below as of 30 Oct 2022 07:41  Patient On (Oxygen Delivery Method): room air                Consultant(s) Notes Reviewed:  [x ] YES  [ ] NO          MEDICATIONS  (STANDING):  allopurinol 300 milliGRAM(s) Oral daily  cefTRIAXone   IVPB 1000 milliGRAM(s) IV Intermittent every 24 hours  finasteride 5 milliGRAM(s) Oral daily  heparin   Injectable 5000 Unit(s) SubCutaneous every 12 hours  losartan 100 milliGRAM(s) Oral daily  metoprolol succinate  milliGRAM(s) Oral daily  sodium chloride 0.9%. 1000 milliLiter(s) (100 mL/Hr) IV Continuous <Continuous>  tamsulosin 0.8 milliGRAM(s) Oral at bedtime    MEDICATIONS  (PRN):  acetaminophen     Tablet .. 650 milliGRAM(s) Oral every 6 hours PRN Temp greater or equal to 38C (100.4F), Moderate Pain (4 - 6)  ketorolac   Injectable 15 milliGRAM(s) IV Push every 6 hours PRN Severe Pain (7 - 10)  ondansetron Injectable 4 milliGRAM(s) IV Push three times a day PRN Nausea and/or Vomiting      LABS                          11.1   9.89  )-----------( 130      ( 30 Oct 2022 07:15 )             32.6     10-30    146  |  112<H>  |  23<H>  ----------------------------<  170<H>  4.3   |  22  |  1.7<H>    Ca    8.4      30 Oct 2022 07:15  Mg     1.9     10-    TPro  6.3  /  Alb  3.7  /  TBili  0.4  /  DBili  x   /  AST  18  /  ALT  11  /  AlkPhos  79  10-30      Urinalysis Basic - ( 28 Oct 2022 23:54 )    Color: Light Yellow / Appearance: Clear / S.017 / pH: x  Gluc: x / Ketone: Trace  / Bili: Negative / Urobili: <2 mg/dL   Blood: x / Protein: Trace / Nitrite: Negative   Leuk Esterase: Negative / RBC: 14 /HPF / WBC 3 /HPF   Sq Epi: x / Non Sq Epi: 3 /HPF / Bacteria: Few        Lactate Trend  10-29 @ 20:50 Lactate:2.8   10-28 @ 21:22 Lactate:2.6         CAPILLARY BLOOD GLUCOSE      POCT Blood Glucose.: 152 mg/dL (30 Oct 2022 11:22)        RADIOLOGY & ADDITIONAL TESTS:    Imaging Personally Reviewed:  [ ] YES  [ ] NO    HEALTH ISSUES - PROBLEM Dx:          PHYSICAL EXAM:  GENERAL: NAD, well-developed.  HEAD:  Atraumatic, Normocephalic.  EYES: EOMI, PERRLA, conjunctiva and sclera clear.  NECK: Supple, No JVD.  CHEST/LUNG: Clear to auscultation bilaterally; No wheeze.  HEART: Regular rate and rhythm; S1 S2.   ABDOMEN: Soft, Nontender, Nondistended; Bowel sounds present.  EXTREMITIES:  2+ Peripheral Pulses, No clubbing, cyanosis, or edema.  PSYCH: AAOx3.  NEUROLOGY: non-focal.  SKIN: No rashes or lesions.
  SIDDHARTHA VALVERDE  71y, Male  Allergy: No Known Allergies    Hospital Day: 2d    Patient seen and examined. No acute events overnight    PMH/PSH:  PAST MEDICAL & SURGICAL HISTORY:  HTN (hypertension)      Gout      Diabetes      BPH (benign prostatic hyperplasia)      Multiple myeloma          VITALS:  T(F): 98.2 (10-31-22 @ 13:04), Max: 99 (10-30-22 @ 21:13)  HR: 103 (10-31-22 @ 13:04)  BP: 130/75 (10-31-22 @ 13:04) (130/75 - 215/114)  RR: 20 (10-31-22 @ 13:04)  SpO2: 97% (10-31-22 @ 13:04)    TESTS & MEASUREMENTS:  Weight (Kg): 81.6 (10-29-22 @ 20:17)                            10.8   7.51  )-----------( 126      ( 31 Oct 2022 07:26 )             33.2     PT/INR - ( 31 Oct 2022 07:26 )   PT: 14.30 sec;   INR: 1.25 ratio         PTT - ( 31 Oct 2022 07:26 )  PTT:26.0 sec  10-31    137  |  105  |  20  ----------------------------<  144<H>  4.1   |  25  |  1.3    Ca    8.4      31 Oct 2022 07:26  Mg     1.9     10-31    TPro  6.0  /  Alb  3.8  /  TBili  0.5  /  DBili  x   /  AST  17  /  ALT  11  /  AlkPhos  70  10-31    LIVER FUNCTIONS - ( 31 Oct 2022 07:26 )  Alb: 3.8 g/dL / Pro: 6.0 g/dL / ALK PHOS: 70 U/L / ALT: 11 U/L / AST: 17 U/L / GGT: x                 Culture - Urine (collected 10-29-22 @ 20:51)  Source: Clean Catch Clean Catch (Midstream)  Final Report (10-31-22 @ 07:32):    No growth    Culture - Blood (collected 10-29-22 @ 20:50)  Source: .Blood Blood-Peripheral  Preliminary Report (10-31-22 @ 07:02):    No growth to date.          RADIOLOGY & ADDITIONAL TESTS:    RECENT DIAGNOSTIC ORDERS:      MEDICATIONS:  MEDICATIONS  (STANDING):  allopurinol 300 milliGRAM(s) Oral daily  cefTRIAXone   IVPB 1000 milliGRAM(s) IV Intermittent every 24 hours  finasteride 5 milliGRAM(s) Oral daily  heparin   Injectable 5000 Unit(s) SubCutaneous every 12 hours  losartan 100 milliGRAM(s) Oral daily  metoprolol succinate  milliGRAM(s) Oral daily  sodium chloride 0.9%. 1000 milliLiter(s) (100 mL/Hr) IV Continuous <Continuous>  tamsulosin 0.8 milliGRAM(s) Oral at bedtime    MEDICATIONS  (PRN):  acetaminophen     Tablet .. 650 milliGRAM(s) Oral every 6 hours PRN Temp greater or equal to 38C (100.4F), Moderate Pain (4 - 6)  ketorolac   Injectable 15 milliGRAM(s) IV Push every 6 hours PRN Severe Pain (7 - 10)  ondansetron Injectable 4 milliGRAM(s) IV Push three times a day PRN Nausea and/or Vomiting      HOME MEDICATIONS:  allopurinol 300 mg oral tablet (10-29)  dutasteride 0.5 mg oral capsule (10-29)  metFORMIN 500 mg oral tablet (10-29)  metoprolol succinate 100 mg oral tablet, extended release (10-29)  olmesartan 40 mg oral tablet (10-29)      REVIEW OF SYSTEMS:  All other review of systems is negative unless indicated above.     PHYSICAL EXAM:  PHYSICAL EXAM:  GENERAL: NAD, well-developed  HEAD:  Atraumatic, Normocephalic  NECK: Supple, No JVD  CHEST/LUNG: Clear to auscultation bilaterally; No wheeze  HEART: Regular rate and rhythm; No murmurs, rubs, or gallops  ABDOMEN: Soft, Nontender, Nondistended; Bowel sounds present  EXTREMITIES:  2+ Peripheral Pulses, No clubbing, cyanosis, or edema  SKIN: No rashes or lesions

## 2022-10-31 NOTE — PROGRESS NOTE ADULT - ASSESSMENT
70 y/o Male with a left 4x3 proximal ureteral stone. Pt doing better,  no pain at this time, he had pain 2 hrs ago    A) Left proximal 4x3 mm ureteral stone  left hydronephrosis  GM    P) Keep NPO  transfer to [facility] for left JJ stent  pt wishes to have procedure  will d/w attending

## 2022-11-04 LAB
CULTURE RESULTS: SIGNIFICANT CHANGE UP
SPECIMEN SOURCE: SIGNIFICANT CHANGE UP

## 2022-11-15 DIAGNOSIS — R65.10 SYSTEMIC INFLAMMATORY RESPONSE SYNDROME (SIRS) OF NON-INFECTIOUS ORIGIN WITHOUT ACUTE ORGAN DYSFUNCTION: ICD-10-CM

## 2022-11-15 DIAGNOSIS — M10.9 GOUT, UNSPECIFIED: ICD-10-CM

## 2022-11-15 DIAGNOSIS — E11.9 TYPE 2 DIABETES MELLITUS WITHOUT COMPLICATIONS: ICD-10-CM

## 2022-11-15 DIAGNOSIS — N17.9 ACUTE KIDNEY FAILURE, UNSPECIFIED: ICD-10-CM

## 2022-11-15 DIAGNOSIS — N39.0 URINARY TRACT INFECTION, SITE NOT SPECIFIED: ICD-10-CM

## 2022-11-15 DIAGNOSIS — C90.00 MULTIPLE MYELOMA NOT HAVING ACHIEVED REMISSION: ICD-10-CM

## 2022-11-15 DIAGNOSIS — I10 ESSENTIAL (PRIMARY) HYPERTENSION: ICD-10-CM

## 2022-11-15 DIAGNOSIS — E86.0 DEHYDRATION: ICD-10-CM

## 2022-11-15 DIAGNOSIS — Z79.84 LONG TERM (CURRENT) USE OF ORAL HYPOGLYCEMIC DRUGS: ICD-10-CM

## 2022-11-15 DIAGNOSIS — N13.6 PYONEPHROSIS: ICD-10-CM

## 2022-11-15 DIAGNOSIS — K40.90 UNILATERAL INGUINAL HERNIA, WITHOUT OBSTRUCTION OR GANGRENE, NOT SPECIFIED AS RECURRENT: ICD-10-CM

## 2022-11-15 DIAGNOSIS — N40.0 BENIGN PROSTATIC HYPERPLASIA WITHOUT LOWER URINARY TRACT SYMPTOMS: ICD-10-CM
